# Patient Record
Sex: MALE | Race: ASIAN | NOT HISPANIC OR LATINO | Employment: FULL TIME | URBAN - METROPOLITAN AREA
[De-identification: names, ages, dates, MRNs, and addresses within clinical notes are randomized per-mention and may not be internally consistent; named-entity substitution may affect disease eponyms.]

---

## 2018-05-18 ENCOUNTER — OFFICE VISIT - HEALTHEAST (OUTPATIENT)
Dept: FAMILY MEDICINE | Facility: CLINIC | Age: 38
End: 2018-05-18

## 2018-05-18 DIAGNOSIS — G47.33 OBSTRUCTIVE SLEEP APNEA SYNDROME: ICD-10-CM

## 2018-05-18 DIAGNOSIS — F43.21 ADJUSTMENT REACTION OF ADOLESCENCE WITH DEPRESSED MOOD: ICD-10-CM

## 2018-05-18 DIAGNOSIS — R03.0 ELEVATED BLOOD PRESSURE READING WITHOUT DIAGNOSIS OF HYPERTENSION: ICD-10-CM

## 2018-05-18 LAB
ALBUMIN SERPL-MCNC: 3.6 G/DL (ref 3.5–5)
ALBUMIN UR-MCNC: ABNORMAL MG/DL
ALP SERPL-CCNC: 62 U/L (ref 45–120)
ALT SERPL W P-5'-P-CCNC: 43 U/L (ref 0–45)
ANION GAP SERPL CALCULATED.3IONS-SCNC: 11 MMOL/L (ref 5–18)
APPEARANCE UR: CLEAR
AST SERPL W P-5'-P-CCNC: 31 U/L (ref 0–40)
BACTERIA #/AREA URNS HPF: ABNORMAL HPF
BILIRUB SERPL-MCNC: 0.7 MG/DL (ref 0–1)
BILIRUB UR QL STRIP: NEGATIVE
BUN SERPL-MCNC: 17 MG/DL (ref 8–22)
CALCIUM SERPL-MCNC: 8.9 MG/DL (ref 8.5–10.5)
CHLORIDE BLD-SCNC: 107 MMOL/L (ref 98–107)
CO2 SERPL-SCNC: 22 MMOL/L (ref 22–31)
COLOR UR AUTO: YELLOW
CREAT SERPL-MCNC: 0.74 MG/DL (ref 0.7–1.3)
CREAT UR-MCNC: 155.2 MG/DL
GFR SERPL CREATININE-BSD FRML MDRD: >60 ML/MIN/1.73M2
GLUCOSE BLD-MCNC: 99 MG/DL (ref 70–125)
GLUCOSE UR STRIP-MCNC: NEGATIVE MG/DL
HBA1C MFR BLD: 5.9 % (ref 3.5–6)
HGB UR QL STRIP: NEGATIVE
KETONES UR STRIP-MCNC: NEGATIVE MG/DL
LEUKOCYTE ESTERASE UR QL STRIP: NEGATIVE
MICROALBUMIN UR-MCNC: 12.62 MG/DL (ref 0–1.99)
MICROALBUMIN/CREAT UR: 81.3 MG/G
MUCOUS THREADS #/AREA URNS LPF: ABNORMAL LPF
NITRATE UR QL: NEGATIVE
PH UR STRIP: 5.5 [PH] (ref 5–8)
POTASSIUM BLD-SCNC: 3.8 MMOL/L (ref 3.5–5)
PROLACTIN SERPL-MCNC: 5.2 NG/ML (ref 0–15)
PROT SERPL-MCNC: 7.5 G/DL (ref 6–8)
RBC #/AREA URNS AUTO: ABNORMAL HPF
SODIUM SERPL-SCNC: 140 MMOL/L (ref 136–145)
SP GR UR STRIP: 1.02 (ref 1–1.03)
SQUAMOUS #/AREA URNS AUTO: ABNORMAL LPF
TSH SERPL DL<=0.005 MIU/L-ACNC: 2.08 UIU/ML (ref 0.3–5)
UROBILINOGEN UR STRIP-ACNC: ABNORMAL
WBC #/AREA URNS AUTO: ABNORMAL HPF

## 2018-05-18 ASSESSMENT — MIFFLIN-ST. JEOR: SCORE: 2016.23

## 2018-05-22 ENCOUNTER — COMMUNICATION - HEALTHEAST (OUTPATIENT)
Dept: FAMILY MEDICINE | Facility: CLINIC | Age: 38
End: 2018-05-22

## 2018-05-22 LAB
TESTOST SERPL-MCNC: 256 NG/DL (ref 240–950)
TESTOSTERONE, BIOAVAILABLE, S: 110 NG/DL (ref 72–235)

## 2018-06-04 ENCOUNTER — COMMUNICATION - HEALTHEAST (OUTPATIENT)
Dept: FAMILY MEDICINE | Facility: CLINIC | Age: 38
End: 2018-06-04

## 2019-01-11 ENCOUNTER — RECORDS - HEALTHEAST (OUTPATIENT)
Dept: GENERAL RADIOLOGY | Facility: CLINIC | Age: 39
End: 2019-01-11

## 2019-01-11 ENCOUNTER — COMMUNICATION - HEALTHEAST (OUTPATIENT)
Dept: FAMILY MEDICINE | Facility: CLINIC | Age: 39
End: 2019-01-11

## 2019-01-11 ENCOUNTER — OFFICE VISIT - HEALTHEAST (OUTPATIENT)
Dept: FAMILY MEDICINE | Facility: CLINIC | Age: 39
End: 2019-01-11

## 2019-01-11 DIAGNOSIS — R05.9 COUGH: ICD-10-CM

## 2019-01-11 DIAGNOSIS — J01.90 ACUTE SINUSITIS, RECURRENCE NOT SPECIFIED, UNSPECIFIED LOCATION: ICD-10-CM

## 2019-01-15 ENCOUNTER — OFFICE VISIT - HEALTHEAST (OUTPATIENT)
Dept: FAMILY MEDICINE | Facility: CLINIC | Age: 39
End: 2019-01-15

## 2019-01-15 DIAGNOSIS — R05.9 COUGH: ICD-10-CM

## 2019-01-19 ENCOUNTER — COMMUNICATION - HEALTHEAST (OUTPATIENT)
Dept: FAMILY MEDICINE | Facility: CLINIC | Age: 39
End: 2019-01-19

## 2019-01-20 ENCOUNTER — OFFICE VISIT - HEALTHEAST (OUTPATIENT)
Dept: FAMILY MEDICINE | Facility: CLINIC | Age: 39
End: 2019-01-20

## 2019-01-20 DIAGNOSIS — I10 HYPERTENSION, UNSPECIFIED TYPE: ICD-10-CM

## 2019-01-20 DIAGNOSIS — R05.9 COUGH: ICD-10-CM

## 2019-01-20 DIAGNOSIS — B96.89 ACUTE BACTERIAL SINUSITIS: ICD-10-CM

## 2019-01-20 DIAGNOSIS — J18.9 PNEUMONIA OF RIGHT LOWER LOBE DUE TO INFECTIOUS ORGANISM: ICD-10-CM

## 2019-01-20 DIAGNOSIS — J40 BRONCHITIS: ICD-10-CM

## 2019-01-20 DIAGNOSIS — R06.02 SOB (SHORTNESS OF BREATH): ICD-10-CM

## 2019-01-20 DIAGNOSIS — J01.90 ACUTE BACTERIAL SINUSITIS: ICD-10-CM

## 2019-01-20 DIAGNOSIS — H65.02 ACUTE SEROUS OTITIS MEDIA OF LEFT EAR, RECURRENCE NOT SPECIFIED: ICD-10-CM

## 2019-02-05 ENCOUNTER — OFFICE VISIT - HEALTHEAST (OUTPATIENT)
Dept: INTERNAL MEDICINE | Facility: CLINIC | Age: 39
End: 2019-02-05

## 2019-02-05 DIAGNOSIS — Z87.891 HISTORY OF NICOTINE DEPENDENCE: ICD-10-CM

## 2019-02-05 DIAGNOSIS — K21.9 GASTROESOPHAGEAL REFLUX DISEASE WITHOUT ESOPHAGITIS: ICD-10-CM

## 2019-02-05 DIAGNOSIS — J40 BRONCHITIS: ICD-10-CM

## 2019-02-05 ASSESSMENT — MIFFLIN-ST. JEOR: SCORE: 1998.09

## 2019-02-27 ENCOUNTER — OFFICE VISIT - HEALTHEAST (OUTPATIENT)
Dept: FAMILY MEDICINE | Facility: CLINIC | Age: 39
End: 2019-02-27

## 2019-02-27 DIAGNOSIS — R06.09 DYSPNEA ON EXERTION: ICD-10-CM

## 2019-02-27 DIAGNOSIS — R07.89 CHEST TIGHTNESS: ICD-10-CM

## 2019-02-28 ENCOUNTER — COMMUNICATION - HEALTHEAST (OUTPATIENT)
Dept: FAMILY MEDICINE | Facility: CLINIC | Age: 39
End: 2019-02-28

## 2019-03-01 ENCOUNTER — COMMUNICATION - HEALTHEAST (OUTPATIENT)
Dept: CARE COORDINATION | Facility: CLINIC | Age: 39
End: 2019-03-01

## 2019-03-04 ENCOUNTER — AMBULATORY - HEALTHEAST (OUTPATIENT)
Dept: PULMONOLOGY | Facility: OTHER | Age: 39
End: 2019-03-04

## 2019-03-04 ENCOUNTER — OFFICE VISIT - HEALTHEAST (OUTPATIENT)
Dept: FAMILY MEDICINE | Facility: CLINIC | Age: 39
End: 2019-03-04

## 2019-03-04 DIAGNOSIS — G47.33 OBSTRUCTIVE SLEEP APNEA SYNDROME: ICD-10-CM

## 2019-03-04 DIAGNOSIS — R06.83 SNORING: ICD-10-CM

## 2019-03-04 DIAGNOSIS — J45.40 REACTIVE AIRWAY DISEASE WITH WHEEZING, MODERATE PERSISTENT, UNCOMPLICATED: ICD-10-CM

## 2019-03-04 DIAGNOSIS — G47.10 HYPERSOMNOLENCE: ICD-10-CM

## 2019-03-04 DIAGNOSIS — J45.909 REACTIVE AIRWAY DISEASE: ICD-10-CM

## 2019-03-25 ENCOUNTER — COMMUNICATION - HEALTHEAST (OUTPATIENT)
Dept: FAMILY MEDICINE | Facility: CLINIC | Age: 39
End: 2019-03-25

## 2019-03-25 ENCOUNTER — OFFICE VISIT - HEALTHEAST (OUTPATIENT)
Dept: SLEEP MEDICINE | Facility: CLINIC | Age: 39
End: 2019-03-25

## 2019-03-25 DIAGNOSIS — E66.813 CLASS 3 SEVERE OBESITY WITH SERIOUS COMORBIDITY IN ADULT, UNSPECIFIED BMI, UNSPECIFIED OBESITY TYPE (H): ICD-10-CM

## 2019-03-25 DIAGNOSIS — R29.818 SUSPECTED SLEEP APNEA: ICD-10-CM

## 2019-03-25 DIAGNOSIS — E66.01 CLASS 3 SEVERE OBESITY WITH SERIOUS COMORBIDITY IN ADULT, UNSPECIFIED BMI, UNSPECIFIED OBESITY TYPE (H): ICD-10-CM

## 2019-03-25 DIAGNOSIS — R53.82 CHRONIC FATIGUE: ICD-10-CM

## 2019-03-25 DIAGNOSIS — R06.83 SNORING: ICD-10-CM

## 2019-03-25 ASSESSMENT — MIFFLIN-ST. JEOR: SCORE: 2027.57

## 2019-03-26 ENCOUNTER — OFFICE VISIT - HEALTHEAST (OUTPATIENT)
Dept: FAMILY MEDICINE | Facility: CLINIC | Age: 39
End: 2019-03-26

## 2019-03-26 ENCOUNTER — RECORDS - HEALTHEAST (OUTPATIENT)
Dept: GENERAL RADIOLOGY | Facility: CLINIC | Age: 39
End: 2019-03-26

## 2019-03-26 DIAGNOSIS — J45.40 REACTIVE AIRWAY DISEASE WITH WHEEZING, MODERATE PERSISTENT, UNCOMPLICATED: ICD-10-CM

## 2019-03-26 DIAGNOSIS — R07.81 PLEURODYNIA: ICD-10-CM

## 2019-03-26 DIAGNOSIS — R07.81 RIB PAIN ON LEFT SIDE: ICD-10-CM

## 2019-03-26 DIAGNOSIS — S22.32XA CLOSED FRACTURE OF ONE RIB OF LEFT SIDE, INITIAL ENCOUNTER: ICD-10-CM

## 2019-04-10 ENCOUNTER — COMMUNICATION - HEALTHEAST (OUTPATIENT)
Dept: FAMILY MEDICINE | Facility: CLINIC | Age: 39
End: 2019-04-10

## 2019-04-19 ENCOUNTER — COMMUNICATION - HEALTHEAST (OUTPATIENT)
Dept: FAMILY MEDICINE | Facility: CLINIC | Age: 39
End: 2019-04-19

## 2019-04-23 ENCOUNTER — RECORDS - HEALTHEAST (OUTPATIENT)
Dept: GENERAL RADIOLOGY | Facility: CLINIC | Age: 39
End: 2019-04-23

## 2019-04-23 ENCOUNTER — OFFICE VISIT - HEALTHEAST (OUTPATIENT)
Dept: FAMILY MEDICINE | Facility: CLINIC | Age: 39
End: 2019-04-23

## 2019-04-23 DIAGNOSIS — R05.9 COUGH: ICD-10-CM

## 2019-04-23 DIAGNOSIS — J45.40 REACTIVE AIRWAY DISEASE WITH WHEEZING, MODERATE PERSISTENT, UNCOMPLICATED: ICD-10-CM

## 2019-04-23 DIAGNOSIS — R07.81 RIB PAIN ON LEFT SIDE: ICD-10-CM

## 2019-04-23 DIAGNOSIS — K14.3 COATED TONGUE: ICD-10-CM

## 2019-04-23 DIAGNOSIS — I10 BENIGN ESSENTIAL HTN: ICD-10-CM

## 2019-04-23 DIAGNOSIS — S22.42XD CLOSED FRACTURE OF MULTIPLE RIBS OF LEFT SIDE WITH ROUTINE HEALING, SUBSEQUENT ENCOUNTER: ICD-10-CM

## 2019-04-23 LAB
BASOPHILS # BLD AUTO: 0.1 THOU/UL (ref 0–0.2)
BASOPHILS NFR BLD AUTO: 1 % (ref 0–2)
EOSINOPHIL # BLD AUTO: 0.7 THOU/UL (ref 0–0.4)
EOSINOPHIL NFR BLD AUTO: 9 % (ref 0–6)
ERYTHROCYTE [DISTWIDTH] IN BLOOD BY AUTOMATED COUNT: 12.7 % (ref 11–14.5)
HCT VFR BLD AUTO: 48.7 % (ref 40–54)
HGB BLD-MCNC: 16.3 G/DL (ref 14–18)
LYMPHOCYTES # BLD AUTO: 1.7 THOU/UL (ref 0.8–4.4)
LYMPHOCYTES NFR BLD AUTO: 23 % (ref 20–40)
MCH RBC QN AUTO: 29.4 PG (ref 27–34)
MCHC RBC AUTO-ENTMCNC: 33.4 G/DL (ref 32–36)
MCV RBC AUTO: 88 FL (ref 80–100)
MONOCYTES # BLD AUTO: 0.5 THOU/UL (ref 0–0.9)
MONOCYTES NFR BLD AUTO: 7 % (ref 2–10)
NEUTROPHILS # BLD AUTO: 4.5 THOU/UL (ref 2–7.7)
NEUTROPHILS NFR BLD AUTO: 60 % (ref 50–70)
PLATELET # BLD AUTO: 195 THOU/UL (ref 140–440)
PMV BLD AUTO: 7 FL (ref 7–10)
RBC # BLD AUTO: 5.52 MILL/UL (ref 4.4–6.2)
WBC: 7.5 THOU/UL (ref 4–11)

## 2019-04-23 ASSESSMENT — MIFFLIN-ST. JEOR: SCORE: 1995.82

## 2019-04-24 ENCOUNTER — AMBULATORY - HEALTHEAST (OUTPATIENT)
Dept: FAMILY MEDICINE | Facility: CLINIC | Age: 39
End: 2019-04-24

## 2019-04-24 ENCOUNTER — COMMUNICATION - HEALTHEAST (OUTPATIENT)
Dept: FAMILY MEDICINE | Facility: CLINIC | Age: 39
End: 2019-04-24

## 2019-04-24 ENCOUNTER — AMBULATORY - HEALTHEAST (OUTPATIENT)
Dept: SCHEDULING | Facility: CLINIC | Age: 39
End: 2019-04-24

## 2019-04-24 DIAGNOSIS — S22.42XD CLOSED FRACTURE OF MULTIPLE RIBS OF LEFT SIDE WITH ROUTINE HEALING, SUBSEQUENT ENCOUNTER: ICD-10-CM

## 2019-04-24 LAB — BNP SERPL-MCNC: 23 PG/ML (ref 0–35)

## 2019-04-25 ENCOUNTER — AMBULATORY - HEALTHEAST (OUTPATIENT)
Dept: FAMILY MEDICINE | Facility: CLINIC | Age: 39
End: 2019-04-25

## 2019-04-25 DIAGNOSIS — J45.40 REACTIVE AIRWAY DISEASE WITH WHEEZING, MODERATE PERSISTENT, UNCOMPLICATED: ICD-10-CM

## 2019-05-03 ENCOUNTER — OFFICE VISIT - HEALTHEAST (OUTPATIENT)
Dept: FAMILY MEDICINE | Facility: CLINIC | Age: 39
End: 2019-05-03

## 2019-05-03 DIAGNOSIS — J45.40 REACTIVE AIRWAY DISEASE WITH WHEEZING, MODERATE PERSISTENT, UNCOMPLICATED: ICD-10-CM

## 2019-05-03 DIAGNOSIS — S22.42XD CLOSED FRACTURE OF MULTIPLE RIBS OF LEFT SIDE WITH ROUTINE HEALING, SUBSEQUENT ENCOUNTER: ICD-10-CM

## 2019-05-06 LAB
PATH ICD:: NORMAL
PROT PATTERN SERPL IFE-IMP: NORMAL
REVIEWING PATHOLOGIST: NORMAL

## 2019-05-07 LAB
PATH ICD:: NORMAL
PROT ELPH PNL UR ELPH: NORMAL
REVIEWING PATHOLOGIST: NORMAL

## 2019-05-09 ENCOUNTER — COMMUNICATION - HEALTHEAST (OUTPATIENT)
Dept: FAMILY MEDICINE | Facility: CLINIC | Age: 39
End: 2019-05-09

## 2019-05-15 ENCOUNTER — COMMUNICATION - HEALTHEAST (OUTPATIENT)
Dept: FAMILY MEDICINE | Facility: CLINIC | Age: 39
End: 2019-05-15

## 2019-05-24 ENCOUNTER — COMMUNICATION - HEALTHEAST (OUTPATIENT)
Dept: FAMILY MEDICINE | Facility: CLINIC | Age: 39
End: 2019-05-24

## 2019-05-24 DIAGNOSIS — I10 BENIGN ESSENTIAL HTN: ICD-10-CM

## 2019-06-24 ENCOUNTER — COMMUNICATION - HEALTHEAST (OUTPATIENT)
Dept: FAMILY MEDICINE | Facility: CLINIC | Age: 39
End: 2019-06-24

## 2019-07-02 ENCOUNTER — COMMUNICATION - HEALTHEAST (OUTPATIENT)
Dept: FAMILY MEDICINE | Facility: CLINIC | Age: 39
End: 2019-07-02

## 2019-07-02 DIAGNOSIS — J45.40 REACTIVE AIRWAY DISEASE WITH WHEEZING, MODERATE PERSISTENT, UNCOMPLICATED: ICD-10-CM

## 2019-07-25 ENCOUNTER — COMMUNICATION - HEALTHEAST (OUTPATIENT)
Dept: PULMONOLOGY | Facility: OTHER | Age: 39
End: 2019-07-25

## 2019-08-13 ENCOUNTER — RECORDS - HEALTHEAST (OUTPATIENT)
Dept: ADMINISTRATIVE | Facility: OTHER | Age: 39
End: 2019-08-13

## 2019-08-13 ENCOUNTER — TRANSFERRED RECORDS (OUTPATIENT)
Dept: HEALTH INFORMATION MANAGEMENT | Facility: CLINIC | Age: 39
End: 2019-08-13

## 2019-08-13 ENCOUNTER — RECORDS - HEALTHEAST (OUTPATIENT)
Dept: PULMONOLOGY | Facility: OTHER | Age: 39
End: 2019-08-13

## 2019-08-13 ENCOUNTER — OFFICE VISIT - HEALTHEAST (OUTPATIENT)
Dept: PULMONOLOGY | Facility: OTHER | Age: 39
End: 2019-08-13

## 2019-08-13 DIAGNOSIS — J45.51 SEVERE PERSISTENT ASTHMA WITH EXACERBATION (H): ICD-10-CM

## 2019-08-13 DIAGNOSIS — J45.909 UNSPECIFIED ASTHMA, UNCOMPLICATED: ICD-10-CM

## 2019-08-13 DIAGNOSIS — J45.40 REACTIVE AIRWAY DISEASE WITH WHEEZING, MODERATE PERSISTENT, UNCOMPLICATED: ICD-10-CM

## 2019-08-13 DIAGNOSIS — J45.50 SEVERE PERSISTENT ASTHMA WITHOUT COMPLICATION (H): ICD-10-CM

## 2019-08-13 ASSESSMENT — MIFFLIN-ST. JEOR: SCORE: 2013.96

## 2019-08-14 ENCOUNTER — COMMUNICATION - HEALTHEAST (OUTPATIENT)
Dept: PULMONOLOGY | Facility: OTHER | Age: 39
End: 2019-08-14

## 2019-10-07 ENCOUNTER — OFFICE VISIT - HEALTHEAST (OUTPATIENT)
Dept: PULMONOLOGY | Facility: OTHER | Age: 39
End: 2019-10-07

## 2019-10-07 ENCOUNTER — TRANSFERRED RECORDS (OUTPATIENT)
Dept: HEALTH INFORMATION MANAGEMENT | Facility: CLINIC | Age: 39
End: 2019-10-07

## 2019-10-07 DIAGNOSIS — J45.51 SEVERE PERSISTENT ASTHMA WITH EXACERBATION (H): ICD-10-CM

## 2019-12-06 ENCOUNTER — AMBULATORY - HEALTHEAST (OUTPATIENT)
Dept: FAMILY MEDICINE | Facility: CLINIC | Age: 39
End: 2019-12-06

## 2019-12-06 DIAGNOSIS — G47.33 OBSTRUCTIVE SLEEP APNEA SYNDROME: ICD-10-CM

## 2019-12-13 ENCOUNTER — TELEPHONE (OUTPATIENT)
Dept: SLEEP MEDICINE | Facility: CLINIC | Age: 39
End: 2019-12-13

## 2019-12-13 ENCOUNTER — PRE VISIT (OUTPATIENT)
Dept: SLEEP MEDICINE | Facility: CLINIC | Age: 39
End: 2019-12-13

## 2019-12-13 RX ORDER — TRIAMTERENE AND HYDROCHLOROTHIAZIDE 37.5; 25 MG/1; MG/1
CAPSULE ORAL EVERY MORNING
COMMUNITY
End: 2021-09-27

## 2019-12-13 RX ORDER — MONTELUKAST SODIUM 10 MG/1
10 TABLET ORAL AT BEDTIME
COMMUNITY
End: 2021-08-10

## 2019-12-13 RX ORDER — AMLODIPINE BESYLATE 5 MG/1
5 TABLET ORAL DAILY
COMMUNITY
End: 2022-01-18

## 2019-12-13 RX ORDER — ALBUTEROL SULFATE 90 UG/1
2 AEROSOL, METERED RESPIRATORY (INHALATION) EVERY 6 HOURS
COMMUNITY
End: 2022-07-12

## 2019-12-13 RX ORDER — IPRATROPIUM BROMIDE AND ALBUTEROL SULFATE 2.5; .5 MG/3ML; MG/3ML
1 SOLUTION RESPIRATORY (INHALATION) EVERY 6 HOURS PRN
COMMUNITY
End: 2022-07-12

## 2019-12-13 RX ORDER — BUDESONIDE AND FORMOTEROL FUMARATE DIHYDRATE 160; 4.5 UG/1; UG/1
2 AEROSOL RESPIRATORY (INHALATION) 2 TIMES DAILY
COMMUNITY
End: 2022-07-12

## 2019-12-13 NOTE — TELEPHONE ENCOUNTER
Records rev'd from [facility] regardin Asthma.  Records have been sent to scanning.    Second request for sleep study records has been made.    ALECIA Yañez

## 2019-12-13 NOTE — TELEPHONE ENCOUNTER
"  1.  Reason for the visit:  Consult to discuss sleep apnea  2.  Referring provider and clinic name:  Self  3.  Previous Sleep Doctor or Pulmonlogist (clinic name)?  Previous study Ordered by [facility] but has not been completed  4.  Records, Procedures, Imaging, and Labs (see below)  No records to obtain        All NOTES from previous office visits that pertain to why they are being seen in the Sleep Center    Previous Sleep Studies, Chest CT, Echos and reports that pertain to why they are seeing Sleep Center    All Sleep records that have been done in the last 2 years that pertain to why they are seeing Sleep Center            Are they being seen for continuation of care for Cpap/Bipap/Avap/Trilogy/Dental Device?     If yes to above Who and Where was Device issued/currently getting supplies from?     Are you currently on \"Supplemental Oxygen\" during the day or night?                                                                                                                                                         Please remind pt to bring Cpap machine and ask to arrive 15 minutes early to appointment due traffic and congestion                                                 5. Pt Sleep Center Packet received     Yes: \"please make sure that you bring this to your appointment completed, either the doctor will not see you until this completed or you may be asked to reschedule your appointment.\"     No: mail or email to the pt and explain, \"please make sure that you bring this to your appointment completed, either the doctor will not see you until this completed or you may be asked to reschedule your appointment.\"     ~If pt coming early to fill packet out, ask that they come 30 minutes prior to their appointment~     6. Has the pt's medication list been updated and preferred pharmacy added?     7. Has the allergy list been reviewed?    \"Thank you for choosing [facility] and we look forward to seeing " "you at your upcoming appointment\"     "

## 2020-01-06 ENCOUNTER — OFFICE VISIT - HEALTHEAST (OUTPATIENT)
Dept: PULMONOLOGY | Facility: OTHER | Age: 40
End: 2020-01-06

## 2020-01-06 DIAGNOSIS — J45.51 SEVERE PERSISTENT ASTHMA WITH EXACERBATION (H): ICD-10-CM

## 2020-01-06 ASSESSMENT — MIFFLIN-ST. JEOR: SCORE: 2072.93

## 2020-01-15 ENCOUNTER — COMMUNICATION - HEALTHEAST (OUTPATIENT)
Dept: FAMILY MEDICINE | Facility: CLINIC | Age: 40
End: 2020-01-15

## 2020-02-03 DIAGNOSIS — R29.818 SUSPECTED SLEEP APNEA: Primary | ICD-10-CM

## 2020-02-03 DIAGNOSIS — R53.82 CHRONIC FATIGUE: ICD-10-CM

## 2020-02-03 DIAGNOSIS — E66.01 MORBID OBESITY (H): ICD-10-CM

## 2020-02-03 DIAGNOSIS — R06.83 SNORING: ICD-10-CM

## 2020-02-07 ENCOUNTER — COMMUNICATION - HEALTHEAST (OUTPATIENT)
Dept: FAMILY MEDICINE | Facility: CLINIC | Age: 40
End: 2020-02-07

## 2020-02-21 ENCOUNTER — AMBULATORY - HEALTHEAST (OUTPATIENT)
Dept: FAMILY MEDICINE | Facility: CLINIC | Age: 40
End: 2020-02-21

## 2020-02-21 ENCOUNTER — COMMUNICATION - HEALTHEAST (OUTPATIENT)
Dept: FAMILY MEDICINE | Facility: CLINIC | Age: 40
End: 2020-02-21

## 2020-02-21 DIAGNOSIS — J45.50 SEVERE PERSISTENT ASTHMA WITHOUT COMPLICATION (H): ICD-10-CM

## 2020-03-04 ENCOUNTER — COMMUNICATION - HEALTHEAST (OUTPATIENT)
Dept: SLEEP MEDICINE | Facility: CLINIC | Age: 40
End: 2020-03-04

## 2020-03-16 ENCOUNTER — OFFICE VISIT - HEALTHEAST (OUTPATIENT)
Dept: PULMONOLOGY | Facility: OTHER | Age: 40
End: 2020-03-16

## 2020-03-16 ENCOUNTER — RECORDS - HEALTHEAST (OUTPATIENT)
Dept: ADMINISTRATIVE | Facility: OTHER | Age: 40
End: 2020-03-16

## 2020-03-16 DIAGNOSIS — J45.51 SEVERE PERSISTENT ASTHMA WITH EXACERBATION (H): ICD-10-CM

## 2020-03-16 DIAGNOSIS — J45.40 REACTIVE AIRWAY DISEASE WITH WHEEZING, MODERATE PERSISTENT, UNCOMPLICATED: ICD-10-CM

## 2020-03-19 ENCOUNTER — COMMUNICATION - HEALTHEAST (OUTPATIENT)
Dept: FAMILY MEDICINE | Facility: CLINIC | Age: 40
End: 2020-03-19

## 2020-03-19 DIAGNOSIS — I10 BENIGN ESSENTIAL HTN: ICD-10-CM

## 2020-04-30 ENCOUNTER — AMBULATORY - HEALTHEAST (OUTPATIENT)
Dept: PULMONOLOGY | Facility: OTHER | Age: 40
End: 2020-04-30

## 2020-04-30 DIAGNOSIS — J45.51 SEVERE PERSISTENT ASTHMA WITH EXACERBATION (H): ICD-10-CM

## 2020-06-04 ENCOUNTER — AMBULATORY - HEALTHEAST (OUTPATIENT)
Dept: PULMONOLOGY | Facility: OTHER | Age: 40
End: 2020-06-04

## 2020-06-04 DIAGNOSIS — J45.51 SEVERE PERSISTENT ASTHMA WITH EXACERBATION (H): ICD-10-CM

## 2020-06-04 DIAGNOSIS — J45.40 REACTIVE AIRWAY DISEASE WITH WHEEZING, MODERATE PERSISTENT, UNCOMPLICATED: ICD-10-CM

## 2020-07-10 ENCOUNTER — COMMUNICATION - HEALTHEAST (OUTPATIENT)
Dept: FAMILY MEDICINE | Facility: CLINIC | Age: 40
End: 2020-07-10

## 2020-07-10 ENCOUNTER — OFFICE VISIT - HEALTHEAST (OUTPATIENT)
Dept: FAMILY MEDICINE | Facility: CLINIC | Age: 40
End: 2020-07-10

## 2020-07-10 DIAGNOSIS — J45.50 SEVERE PERSISTENT ASTHMA WITHOUT COMPLICATION (H): ICD-10-CM

## 2020-07-10 DIAGNOSIS — J45.40 REACTIVE AIRWAY DISEASE WITH WHEEZING, MODERATE PERSISTENT, UNCOMPLICATED: ICD-10-CM

## 2020-07-10 ASSESSMENT — PATIENT HEALTH QUESTIONNAIRE - PHQ9: SUM OF ALL RESPONSES TO PHQ QUESTIONS 1-9: 3

## 2020-07-10 NOTE — ASSESSMENT & PLAN NOTE
Symbicort 2 puffs twice daily  Singulair 10 mg at bedtime    Rescue medication albuterol    Methylprednisolone if worsening symptoms    Tobacco is likely a trigger according to Dr. Davila    Symptoms include cough shortness of breath wheezing  Chest CT showed no focal pneumonias  Some rib fractures      Persistent asthma  Controller medicationSymbicort Singulair  Avoid tobacco  Medrol Dosepak for severe exacerbations  Albuterol for rescue medication  Paperwork filled out today [place]

## 2020-07-14 ENCOUNTER — AMBULATORY - HEALTHEAST (OUTPATIENT)
Dept: PULMONOLOGY | Facility: OTHER | Age: 40
End: 2020-07-14

## 2020-07-14 DIAGNOSIS — J45.51 SEVERE PERSISTENT ASTHMA WITH EXACERBATION (H): ICD-10-CM

## 2020-07-22 ENCOUNTER — COMMUNICATION - HEALTHEAST (OUTPATIENT)
Dept: FAMILY MEDICINE | Facility: CLINIC | Age: 40
End: 2020-07-22

## 2020-07-28 ENCOUNTER — AMBULATORY - HEALTHEAST (OUTPATIENT)
Dept: FAMILY MEDICINE | Facility: CLINIC | Age: 40
End: 2020-07-28

## 2020-07-28 DIAGNOSIS — Z20.9 EXPOSURE TO POTENTIAL INFECTION: ICD-10-CM

## 2020-07-29 ENCOUNTER — COMMUNICATION - HEALTHEAST (OUTPATIENT)
Dept: FAMILY MEDICINE | Facility: CLINIC | Age: 40
End: 2020-07-29

## 2020-07-29 DIAGNOSIS — I10 BENIGN ESSENTIAL HTN: ICD-10-CM

## 2020-07-29 DIAGNOSIS — J45.40 REACTIVE AIRWAY DISEASE WITH WHEEZING, MODERATE PERSISTENT, UNCOMPLICATED: ICD-10-CM

## 2020-09-21 ENCOUNTER — OFFICE VISIT - HEALTHEAST (OUTPATIENT)
Dept: PULMONOLOGY | Facility: OTHER | Age: 40
End: 2020-09-21

## 2020-09-21 DIAGNOSIS — J45.40 REACTIVE AIRWAY DISEASE WITH WHEEZING, MODERATE PERSISTENT, UNCOMPLICATED: ICD-10-CM

## 2020-09-21 ASSESSMENT — MIFFLIN-ST. JEOR: SCORE: 2072.93

## 2020-11-18 ENCOUNTER — COMMUNICATION - HEALTHEAST (OUTPATIENT)
Dept: PULMONOLOGY | Facility: OTHER | Age: 40
End: 2020-11-18

## 2020-11-18 DIAGNOSIS — J45.51 SEVERE PERSISTENT ASTHMA WITH EXACERBATION (H): ICD-10-CM

## 2020-11-20 ENCOUNTER — OFFICE VISIT - HEALTHEAST (OUTPATIENT)
Dept: FAMILY MEDICINE | Facility: CLINIC | Age: 40
End: 2020-11-20

## 2020-11-20 DIAGNOSIS — M25.562 ACUTE PAIN OF LEFT KNEE: ICD-10-CM

## 2020-11-20 DIAGNOSIS — Z23 NEED FOR IMMUNIZATION AGAINST INFLUENZA: ICD-10-CM

## 2020-11-20 DIAGNOSIS — I10 BENIGN ESSENTIAL HTN: ICD-10-CM

## 2020-11-20 ASSESSMENT — MIFFLIN-ST. JEOR: SCORE: 2089.49

## 2020-11-23 ENCOUNTER — COMMUNICATION - HEALTHEAST (OUTPATIENT)
Dept: FAMILY MEDICINE | Facility: CLINIC | Age: 40
End: 2020-11-23

## 2020-12-02 ENCOUNTER — COMMUNICATION - HEALTHEAST (OUTPATIENT)
Dept: FAMILY MEDICINE | Facility: CLINIC | Age: 40
End: 2020-12-02

## 2020-12-02 DIAGNOSIS — I10 BENIGN ESSENTIAL HTN: ICD-10-CM

## 2021-02-01 ENCOUNTER — OFFICE VISIT - HEALTHEAST (OUTPATIENT)
Dept: PULMONOLOGY | Facility: OTHER | Age: 41
End: 2021-02-01

## 2021-02-01 DIAGNOSIS — J45.50 SEVERE PERSISTENT ASTHMA WITHOUT COMPLICATION (H): ICD-10-CM

## 2021-02-01 ASSESSMENT — MIFFLIN-ST. JEOR: SCORE: 2095.61

## 2021-04-02 ENCOUNTER — AMBULATORY - HEALTHEAST (OUTPATIENT)
Dept: NURSING | Facility: CLINIC | Age: 41
End: 2021-04-02

## 2021-04-08 ENCOUNTER — COMMUNICATION - HEALTHEAST (OUTPATIENT)
Dept: PULMONOLOGY | Facility: OTHER | Age: 41
End: 2021-04-08

## 2021-04-08 DIAGNOSIS — J45.901 ASTHMA EXACERBATION: ICD-10-CM

## 2021-04-12 ENCOUNTER — AMBULATORY - HEALTHEAST (OUTPATIENT)
Dept: MULTI SPECIALTY CLINIC | Facility: CLINIC | Age: 41
End: 2021-04-12

## 2021-04-12 DIAGNOSIS — J45.901 ASTHMA EXACERBATION: ICD-10-CM

## 2021-04-23 ENCOUNTER — AMBULATORY - HEALTHEAST (OUTPATIENT)
Dept: NURSING | Facility: CLINIC | Age: 41
End: 2021-04-23

## 2021-05-17 ENCOUNTER — AMBULATORY - HEALTHEAST (OUTPATIENT)
Dept: MULTI SPECIALTY CLINIC | Facility: CLINIC | Age: 41
End: 2021-05-17

## 2021-05-17 DIAGNOSIS — J45.51 SEVERE PERSISTENT ASTHMA WITH EXACERBATION (H): ICD-10-CM

## 2021-05-26 ENCOUNTER — RECORDS - HEALTHEAST (OUTPATIENT)
Dept: ADMINISTRATIVE | Facility: CLINIC | Age: 41
End: 2021-05-26

## 2021-05-27 ENCOUNTER — RECORDS - HEALTHEAST (OUTPATIENT)
Dept: ADMINISTRATIVE | Facility: CLINIC | Age: 41
End: 2021-05-27

## 2021-05-27 ASSESSMENT — PATIENT HEALTH QUESTIONNAIRE - PHQ9: SUM OF ALL RESPONSES TO PHQ QUESTIONS 1-9: 3

## 2021-05-27 NOTE — TELEPHONE ENCOUNTER
Reason contacted:  Regarding Cough  Information relayed:  Spoke with pt's wife pt finished his steroid last 3/19/19.  Pt is having a pain on his left side new his rib cage.  Per pt's wife he is now having more issues with shortness of breath and pain when he coughs.  Pt is scheduled to see the pulmonary dr on 4/8/19 and dong his consult for a sleep study today. Per PCP pt is scheduled to come back in for a follow up tomorrow.  Additional questions: No  Further follow-up needed:  No  Okay to leave a detailed message:  No

## 2021-05-27 NOTE — PROGRESS NOTES
ASSESSMENT & PLAN    No problem-specific Assessment & Plan notes found for this encounter.      Placido was seen today for follow-up.    Diagnoses and all orders for this visit:    Rib pain on left side  -     XR Ribs Left W PA Chest; Future  -     oxyCODONE (ROXICODONE) 5 MG immediate release tablet; 1 TO 2 TABLETS EVERY 8 HOURS BREAKTHROUGH RIB PAIN    Reactive airway disease with wheezing, moderate persistent, uncomplicated  -     codeine-guaiFENesin (GUAIFENESIN AC)  mg/5 mL liquid; Take 5-10 mL by mouth every 6 (six) hours as needed for cough.  -     predniSONE (DELTASONE) 5 MG tablet; Take 15 mg by mouth daily for 5 days, THEN 10 mg daily for 5 days, THEN 5 mg daily for 5 days.  -     montelukast (SINGULAIR) 10 mg tablet; Take 1 tablet (10 mg total) by mouth at bedtime.        Patient Instructions       Follow through with the pulmonologist as your schedule    Let us restart a short course of prednisone  Prednisone 15 mg daily for 5 days then  Prednisone 10 mg daily for 5 days then  Prednisone 5 mg daily for 5 days then stop    Continue Symbicort 2 puffs twice daily  Add Singulair 10 mg at bedtime  Robitussin-AC can be used as a cough suppressant a 10 mL every 6 hours as needed  Have Bocanegra's or Ricola cough drops around to use these regularly    You likely torn muscle in your ribs or fractured a rib with coughing  We will try and sort this out with an x-ray today  You may take oxycodone 5 mg 1-2 tablets up to every 8 hours for pain control  This can be sedating  10 your ibuprofen 800 mg 3 times a day with food for up to 3 weeks        Return in about 3 months (around 6/26/2019).       Little interest or pleasure in doing things: Not at all  Feeling down, depressed, or hopeless: Not at all    CHIEF COMPLAINT: Placido Broderick had concerns including Follow-up (cough with left side pain x 3 days).    [place]: [place].............. had concerns including Follow-up (cough with left side pain x 3 days).    1. Rib pain on left  side    2. Reactive airway disease with wheezing, moderate persistent, uncomplicated          CC:             Why are you here today?                              Follow up cough with left side pain      3/4/2019 at that time he described that he had troubles with his breathing he had actually ended up in the hospital chest x-ray revealed central prominence interstitial  CT scan was densely normal    And recently quit using tobacco    BNP 14  D-dimer 0.38    No history of chronic asthma    No pets at home  Saw me received a prednisone taper  Felt like his breathing was 75% improved  On Sunday 323  Was coughing felt a pop left side immediate pain  Since then he has had ongoing pain in his left chest area more anterior below the nipple line  And has had worsening breathing  The point where he feels like he is having trouble lying flat as well as having trouble with some exertion    Continues to use Symbicort  Continues to use the DuoNeb try  Met with the sleep doctor    Meeting with pulmonary in 2 weeks  Will be traveling to [place]  Here today with his wife        What is the issue like in one word?:                                 Pain left anterior chest  How long is it ongoing: days, weeks,months?:                 Worse since 323  What makes it worse: activity? Eating? Movement? :      Deep breathing coughing  What makes it better?:                                                      Advil  0/10-10/10:Pain/Intesity                                                    moderate pain    Any associated Sx to above complaint:   No new productive cough    Any other Problems in order of Priority:        SUBJECTIVE:  Placido Broderick is a 39 y.o. male    Past Medical History:   Diagnosis Date     Ankylosis Of The Right Foot     Created by Conversion      Helicobacter Pylori (H. Pylori) Infection     Created by Conversion      Morbid obesity (H)     Created by Conversion      Past Surgical History:   Procedure Laterality Date      INGUINAL HERNIA REPAIR Right      Hydrocodone-acetaminophen  Current Outpatient Medications   Medication Sig Dispense Refill     albuterol (PROAIR HFA;PROVENTIL HFA;VENTOLIN HFA) 90 mcg/actuation inhaler Inhale 2 puffs every 4 (four) hours as needed for wheezing or shortness of breath.       budesonide-formoterol (SYMBICORT) 160-4.5 mcg/actuation inhaler Inhale 2 puffs 2 (two) times a day. 1 Inhaler 12     ipratropium-albuterol (DUO-NEB) 0.5-2.5 mg/3 mL nebulizer Take 3 mL by nebulization 4 (four) times a day. 360 mL 0     omeprazole (PRILOSEC) 20 MG capsule Take 1 capsule (20 mg total) by mouth at bedtime. 30 capsule 0     valsartan-hydrochlorothiazide (DIOVAN HCT) 160-25 mg per tablet Take 1 tablet by mouth daily. 90 tablet 3     codeine-guaiFENesin (GUAIFENESIN AC)  mg/5 mL liquid Take 5-10 mL by mouth every 6 (six) hours as needed for cough. 473 mL 0     montelukast (SINGULAIR) 10 mg tablet Take 1 tablet (10 mg total) by mouth at bedtime. 30 tablet 2     oxyCODONE (ROXICODONE) 5 MG immediate release tablet 1 TO 2 TABLETS EVERY 8 HOURS BREAKTHROUGH RIB PAIN 30 tablet 0     predniSONE (DELTASONE) 5 MG tablet Take 15 mg by mouth daily for 5 days, THEN 10 mg daily for 5 days, THEN 5 mg daily for 5 days. 30 tablet 0     No current facility-administered medications for this visit.      Family History   Problem Relation Age of Onset     Snoring Brother      Social History     Socioeconomic History     Marital status:      Spouse name: None     Number of children: None     Years of education: None     Highest education level: None   Occupational History     None   Social Needs     Financial resource strain: None     Food insecurity:     Worry: None     Inability: None     Transportation needs:     Medical: None     Non-medical: None   Tobacco Use     Smoking status: Former Smoker     Last attempt to quit: 2018     Years since quittin.2     Smokeless tobacco: Never Used   Substance and Sexual  Activity     Alcohol use: Yes     Alcohol/week: 8.4 oz     Types: 14 Cans of beer per week     Drug use: No     Sexual activity: Yes     Partners: Female   Lifestyle     Physical activity:     Days per week: None     Minutes per session: None     Stress: None   Relationships     Social connections:     Talks on phone: None     Gets together: None     Attends [employer] service: None     Active member of club or organization: None     Attends meetings of clubs or organizations: None     Relationship status: None     Intimate partner violence:     Fear of current or ex partner: None     Emotionally abused: None     Physically abused: None     Forced sexual activity: None   Other Topics Concern     None   Social History Narrative     None     Patient Active Problem List   Diagnosis     Class 3 obesity in adult     Nicotine Dependence     Prehypertension     Helicobacter Pylori (H. Pylori) Infection     Ankylosis Of The Right Foot     Plantar Fasciitis     Morbid Obesity     Pes Planus     Obstructive sleep apnea syndrome     Vitamin D deficiency     Adjustment disorder with mixed anxiety and depressed mood     Reactive airway disease with wheezing, moderate persistent, uncomplicated     Tachycardia     Benign essential HTN                                              SOCIAL: He  reports that he quit smoking about 3 months ago. he has never used smokeless tobacco. He reports that he drinks about 8.4 oz of alcohol per week. He reports that he does not use drugs.    REVIEW OF SYSTEMS:   Family history not pertinent to chief complaint or presenting problem    Review of Systems:      Nervous System:  No headache, paresthesia or dizziness or fainting                                  Ears: No hearing loss or ringing in the ears    Eyes: No blurring of vision, Double Vision            No redness, itching or dryness.    Nose: No nosebleed or loss of smell    Mouth: No mouth sores or  coated tongue    Throat: No hoarseness or  difficulty swallowing    Neck: No enlarged thyroid or lymph nodes.    Heart: No chest pain, palpitation or irregular heartbeat.                  Lungs: No shortness of breath, positive wheezing or hemoptysis.    Gastrointestinal: No nausea or vomiting, melena or blood in stools.    Kidney/Bladdr: No polyuria, polydipsia, or hematuria.                             Genital/Sexual: No Sex function Changes                                Skin: No rash    Muscles/Joints/Bones: No Muscle morning stiffness, No Effusion of a Joint     Review of systems otherwise negative as requested from patient, except   Those positive ROS outlined and discussed in [place].    OBJECTIVE:  /84 (Patient Site: Right Arm, Patient Position: Sitting, Cuff Size: Adult Regular)   Pulse (!) 104   Wt (!) 268 lb (121.6 kg)   SpO2 95%   BMI 46.00 kg/m      GENERAL:     No acute distress.   Alert and oriented X 3         Physical:    Sclera are slightly injected but non-chemotic  Nasal mucosa is boggy and congested  Oropharynx is clear  Lungs soft but tight wheezing  Cardiac S1-S2 regular sinus no appreciable murmur gallop  Anterior chest wall pain  No lower extremity edema  Calves are supple  Speaks in normal word strings        ASSESSMENT & PLAN    LIKELY ASTHMA EXACERBATION  ? TRIGGER  PLAN PT INSTRUCTION SECTION  SLEEP STUDY SOON  AIR PURIFIER      Cummins was seen today for follow-up.    Diagnoses and all orders for this visit:    Rib pain on left side  -     XR Ribs Left W PA Chest; Future  -     oxyCODONE (ROXICODONE) 5 MG immediate release tablet; 1 TO 2 TABLETS EVERY 8 HOURS BREAKTHROUGH RIB PAIN    Reactive airway disease with wheezing, moderate persistent, uncomplicated  -     codeine-guaiFENesin (GUAIFENESIN AC)  mg/5 mL liquid; Take 5-10 mL by mouth every 6 (six) hours as needed for cough.  -     predniSONE (DELTASONE) 5 MG tablet; Take 15 mg by mouth daily for 5 days, THEN 10 mg daily for 5 days, THEN 5 mg daily for 5 days.  -      montelukast (SINGULAIR) 10 mg tablet; Take 1 tablet (10 mg total) by mouth at bedtime.        Return in about 3 months (around 6/26/2019).       Anticipatory Guidance and Symptomatic Cares Discussed   Advised to call back directly if there are further questions, or if these symptoms fail to improve as anticipated or worsen.  Return to clinic if patient has a clinical concern that warrants an exam.         I spent 35  minutes with this patient face to face, of which 50% or greater was spent in counseling and coordination of care with regards to Cummins was seen today for follow-up.    Diagnoses and all orders for this visit:    Rib pain on left side  -     XR Ribs Left W PA Chest; Future  -     oxyCODONE (ROXICODONE) 5 MG immediate release tablet; 1 TO 2 TABLETS EVERY 8 HOURS BREAKTHROUGH RIB PAIN    Reactive airway disease with wheezing, moderate persistent, uncomplicated  -     codeine-guaiFENesin (GUAIFENESIN AC)  mg/5 mL liquid; Take 5-10 mL by mouth every 6 (six) hours as needed for cough.  -     predniSONE (DELTASONE) 5 MG tablet; Take 15 mg by mouth daily for 5 days, THEN 10 mg daily for 5 days, THEN 5 mg daily for 5 days.  -     montelukast (SINGULAIR) 10 mg tablet; Take 1 tablet (10 mg total) by mouth at bedtime.        Harry Bess MD  Family Medicine   [facility] [id number] [phone]

## 2021-05-27 NOTE — PROGRESS NOTES
Dear Dr. Bess, Harry LEROY Md  [address] [place] [address]    Thank you for the opportunity to participate in the care of Mr. Placido Broderick.    He is a 39 y.o. male who comes to the clinic with a chief complaint of witnessed apnea.  Patient has been told by his wife that he has significant pauses in his breathing during sleep followed by loud snoring for at least 3-4 years.  He also occasionally suffers from fatigue during the day if he does not get his nap.  The patient also occasionally suffers from difficulty initiating and maintaining sleep.  Patient was recently hospitalized with chest discomfort and strongly advised to get a sleep study to rule out obstructive sleep apnea.  The patient's review of system system is otherwise negative.     Ideal Sleep-Wake Cycle(devoid of societal pressure):    Patient would try to initiate sleep at around midnight with a sleep latency of 20-30 minutes. The patient would have 2-3 awakening. Final wake up time is around 7-8 AM.    Past Medical History  Past Medical History:   Diagnosis Date     Ankylosis Of The Right Foot     Created by Conversion      Helicobacter Pylori (H. Pylori) Infection     Created by Conversion      Morbid obesity (H)     Created by Conversion         Past Surgical History  Past Surgical History:   Procedure Laterality Date     INGUINAL HERNIA REPAIR Right         Meds  Current Outpatient Medications   Medication Sig Dispense Refill     albuterol (ACCUNEB) 1.25 mg/3 mL nebulizer solution Take 1.25 mg by nebulization every 4 (four) hours as needed for wheezing or shortness of breath.       albuterol (PROAIR HFA;PROVENTIL HFA;VENTOLIN HFA) 90 mcg/actuation inhaler Inhale 2 puffs every 4 (four) hours as needed for wheezing or shortness of breath.       budesonide-formoterol (SYMBICORT) 160-4.5 mcg/actuation inhaler Inhale 2 puffs 2 (two) times a day. 1 Inhaler 12     ipratropium-albuterol (DUO-NEB) 0.5-2.5 mg/3 mL nebulizer Take 3 mL by nebulization 4  (four) times a day. 360 mL 0     omeprazole (PRILOSEC) 20 MG capsule Take 1 capsule (20 mg total) by mouth at bedtime. 30 capsule 0     valsartan-hydrochlorothiazide (DIOVAN HCT) 160-25 mg per tablet Take 1 tablet by mouth daily. 90 tablet 3     No current facility-administered medications for this visit.         Allergies  Hydrocodone-acetaminophen     Social History  Social History     Socioeconomic History     Marital status:      Spouse name: Not on file     Number of children: Not on file     Years of education: Not on file     Highest education level: Not on file   Occupational History     Not on file   Social Needs     Financial resource strain: Not on file     Food insecurity:     Worry: Not on file     Inability: Not on file     Transportation needs:     Medical: Not on file     Non-medical: Not on file   Tobacco Use     Smoking status: Former Smoker     Last attempt to quit: 2018     Years since quittin.2     Smokeless tobacco: Never Used   Substance and Sexual Activity     Alcohol use: Yes     Alcohol/week: 8.4 oz     Types: 14 Cans of beer per week     Drug use: No     Sexual activity: Yes     Partners: Female   Lifestyle     Physical activity:     Days per week: Not on file     Minutes per session: Not on file     Stress: Not on file   Relationships     Social connections:     Talks on phone: Not on file     Gets together: Not on file     Attends Jainism service: Not on file     Active member of club or organization: Not on file     Attends meetings of clubs or organizations: Not on file     Relationship status: Not on file     Intimate partner violence:     Fear of current or ex partner: Not on file     Emotionally abused: Not on file     Physically abused: Not on file     Forced sexual activity: Not on file   Other Topics Concern     Not on file   Social History Narrative     Not on file        Family History  No family history on file.     Review of Systems:  Constitutional:  Negative except as noted in HPI.   Eyes: Negative except as noted in HPI.   ENT: Negative except as noted in HPI.   Cardiovascular: Negative except as noted in HPI.   Respiratory: Negative except as noted in HPI.   Gastrointestinal: Negative except as noted in HPI.   Genitourinary: Negative except as noted in HPI.   Musculoskeletal: Negative except as noted in HPI.   Integumentary: Negative except as noted in HPI.   Neurological: Negative except as noted in HPI.   Psychiatric: Negative except as noted in HPI.   Endocrine: Negative except as noted in HPI.   Hematologic/Lymphatic: Negative except as noted in HPI.      STOP BANG 3/25/2019   Do you snore loudly (louder than talking or loud enough to be heard through closed doors)? 1   Do you often feel tired, fatigued, or sleepy during daytime? 1   Has anyone observed you stop breathing in your sleep? 1   Do you have or are you being treated for high blood pressure? 1   BMI more than 35 kg/m2 1   Age over 50 years old? 0   Neck circumference greater than 16 inches? 1   Gender male? 1   Total Score 7     Epworths Sleepiness Scale 3/25/2019   Sitting and reading 2   Watching TV 1   Sitting, inactive in a public place (e.g. a theatre or a meeting) 0   As a passenger in a car for an hour without a break 0   Lying down to rest in the afternoon when circumstances permit 1   Sitting and talking to someone 0   Sitting quietly after a lunch without alcohol 0   In a car, while stopped for a few minutes in traffic 0   Total score 4     Rooming 3/25/2019   Usual bedtime 2am   Sleep Latency 20-30 min   Awakenings 2-3   Wake Up Time 617am   Weekends 12am to 7/8am   Difficulty falling asleep Yes   Difficulty staying asleep Yes   Excessive daytime tiredness No   Excessive daytime sleepiness No   Dozing off while driving No   Shift Worker No   Sleep Walking? No   Sleep Talking? No   Kicking or punching? No   Restless legs symptoms No       Physical Exam:  /88   Pulse (!) 115    "Ht 5' 4\" (1.626 m)   Wt (!) 266 lb (120.7 kg)   SpO2 94%   BMI 45.66 kg/m    BMI:Body mass index is 45.66 kg/m .   GEN: NAD, morbidly obese  Head: Normocephalic.  EYES: PERRLA, EOMI  ENT: Oropharynx is clear, mallampatti class 4+ airway.   Nasal mucosa is moist without erythema  Neck : Thyroid is within normal limits.  Neck circumference 18.25\"  CV: Regular rate and rhythm, S1 & S2 positive.  LUNGS: Bilateral breathsounds heard.   ABDOMEN: Positive bowel sounds in all quadrants, soft, no rebound or guarding  MUSCULOSKELETAL: Bilateral trace leg swelling  SKIN: warm, dry, no rashes  Neurological: Alert, oriented to time, place, and person.  Psych: normal mood, normal affect     Labs/Studies:     Lab Results   Component Value Date    WBC 6.2 02/27/2019    HGB 16.3 02/27/2019    HCT 49.5 02/27/2019    MCV 87 02/27/2019     02/27/2019         Chemistry        Component Value Date/Time     02/28/2019 0618    K 3.6 02/28/2019 0618     02/28/2019 0618    CO2 31 02/28/2019 0618    BUN 17 02/28/2019 0618    CREATININE 0.83 02/28/2019 0618     02/28/2019 0618        Component Value Date/Time    CALCIUM 9.1 02/28/2019 0618    ALKPHOS 62 05/18/2018 1437    AST 31 05/18/2018 1437    ALT 43 05/18/2018 1437    BILITOT 0.7 05/18/2018 1437            No results found for: FERRITIN  Lab Results   Component Value Date    TSH 2.08 05/18/2018         Assessment and Plan:  In summary Placido Broderick is a 39 y.o. year old male here for sleep disturbance.  1.  Suspected sleep apnea   Mr. Placido Broderick has high risk for obstructive sleep apnea based on the history of witnessed apnea, snoring and a crowded airway. I educated the patient on the underlying pathophysiology of obstructive sleep apnea. We reviewed the risks associated with sleep apnea, including increased cardiovascular risk and overall death. We talked about treatments briefly. I recommend getting an split-night nocturnal polysomnography. The patient should " return to the clinic to discuss results and treatment option in a patient-centered approach.  2.  Chronic fatigue  3.  Snoring  4.  Morbid obesity    Patient verbalized understanding of these issues, agrees with the plan and all questions were answered today. Patient was given an opportuntity to voice any other symptoms or concerns not listed above. Patient did not have any other symptoms or concerns.      Patient told to return in one week after the sleep study is interpreted.      Pelon Jackman DO  Board Certified in Internal Medicine and Sleep Medicine  [facility].    (Note created with Dragon voice recognition and unintended spelling errors and word substitutions may occur)

## 2021-05-27 NOTE — TELEPHONE ENCOUNTER
Describe your symptoms:  Still coughing after finishing the prednisone  Any pain: no  New/Ongoing: Ongoing  How long have you been having symptoms:  3  week(s)  Have you been seen for this:  Yes.  Have your symptoms changed since this visit? No  Triage offered?: Spouse not with patient  Home remedies tried:  Presnisone  Pharmacy Name and Location:  [facility] on [address]  Okay to leave a detailed message? Yes

## 2021-05-27 NOTE — PATIENT INSTRUCTIONS - HE
Follow through with the pulmonologist as your schedule    Let us restart a short course of prednisone  Prednisone 15 mg daily for 5 days then  Prednisone 10 mg daily for 5 days then  Prednisone 5 mg daily for 5 days then stop    Continue Symbicort 2 puffs twice daily  Add Singulair 10 mg at bedtime  Robitussin-AC can be used as a cough suppressant a 10 mL every 6 hours as needed  Have Bocanegra's or Ricola cough drops around to use these regularly    You likely torn muscle in your ribs or fractured a rib with coughing  We will try and sort this out with an x-ray today  You may take oxycodone 5 mg 1-2 tablets up to every 8 hours for pain control  This can be sedating  10 your ibuprofen 800 mg 3 times a day with food for up to 3 weeks    Melatonin  5 mg at target bedtime     Rib fracture approximately T8  Lifting restrictions through April 12  Expect 6 weeks for healing

## 2021-05-28 ASSESSMENT — ASTHMA QUESTIONNAIRES
ACT_TOTALSCORE: 18
ACT_TOTALSCORE: 13
ACT_TOTALSCORE: 18
ACT_TOTALSCORE: 14

## 2021-05-28 NOTE — PROGRESS NOTES
"ASSESSMENT & PLAN    No problem-specific Assessment & Plan notes found for this encounter.      Placido was seen today for wheezing and rib injury.    Diagnoses and all orders for this visit:    Cough  -     XR Chest 2 Views; Future  -     HM1(CBC and Differential)  -     BNP(B-type Natriuretic Peptide)  -     HM1 (CBC with Diff)  -     XR Chest 1 View; Future    Reactive airway disease with wheezing, moderate persistent, uncomplicated  -     ipratropium-albuterol (DUO-NEB) 0.5-2.5 mg/3 mL nebulizer; Take 3 mL by nebulization 4 (four) times a day.    Closed fracture of multiple ribs of left side with routine healing, subsequent encounter        There are no Patient Instructions on file for this visit.    No follow-ups on file.            CHIEF COMPLAINT: Placido Broderick had concerns including Wheezing and Rib Injury.    [place]: 1.............. had concerns including Wheezing and Rib Injury.    1. Cough    2. Reactive airway disease with wheezing, moderate persistent, uncomplicated    3. Closed fracture of multiple ribs of left side with routine healing, subsequent encounter          CC:             Why are you here today?                         Feeling better until last Saturday    Finished Prednisone  Using inhalers  Symbicort and Duoneb  Worse cough after being outside   Getting the kids ready to go on the bus  \"felt a pop\"  Left side worse    Now feels \"shitty\"  Pain and short breath  Was 70% better  NO feels 20% better.    Wheeze \"not sure triggers\"  \"Cold Air  Last winter [place]\"        Why are you here today?                              Cough     What is the issue like in one word?:                                 Chronic  How long is it ongoing: days, weeks,months?:                 X 1 1/2 months  What makes it worse: activity? Eating? Movement? :      n/a  What makes it better?:                                                      medications  0/10-10/10:Pain/Intesity                                                    " n/a     Any associated Sx to above complaint:   Was admitted to [facility] for breathing issues     Any other Problems in order of Priority:     Colonoscopy due 2026  use your Dulera daily as a prophylactic medication it is her controller medication     Use the albuterol as a rescue medication     Patient had multiple visits  outpatient as well as urgent care for cough with persistence that would improve after being treated with steroids antibiotics and then worsened again     He can identify no triggers     We discussed pets, allergens, even possibly his Diovan medication     Chest x-ray revealed central interstitial prominence with bronchial wall thickening CT scan was essentially within normal limits  Xray 3/2019  Broken rib      He recently quit smoking tobacco was congratulated in the first of the year     Medications were reviewed     Allergies hydrocodone  Currently he is using both albuterol Symbicort Singulair as well as DuoNeb    Sleep Study Monday  Pulmonary 13 th               Any other Problems in order of Priority:        SUBJECTIVE:  Placido Broderick is a 39 y.o. male    Past Medical History:   Diagnosis Date     Ankylosis Of The Right Foot     Created by Conversion      Helicobacter Pylori (H. Pylori) Infection     Created by Conversion      Morbid obesity (H)     Created by Conversion      Past Surgical History:   Procedure Laterality Date     INGUINAL HERNIA REPAIR Right      Hydrocodone-acetaminophen  Current Outpatient Medications   Medication Sig Dispense Refill     albuterol (PROAIR HFA;PROVENTIL HFA;VENTOLIN HFA) 90 mcg/actuation inhaler Inhale 2 puffs every 4 (four) hours as needed for wheezing or shortness of breath.       budesonide-formoterol (SYMBICORT) 160-4.5 mcg/actuation inhaler Inhale 2 puffs 2 (two) times a day. 1 Inhaler 12     montelukast (SINGULAIR) 10 mg tablet Take 1 tablet (10 mg total) by mouth at bedtime. 30 tablet 2     valsartan-hydrochlorothiazide (DIOVAN HCT) 160-25 mg  per tablet Take 1 tablet by mouth daily. 90 tablet 3     ipratropium-albuterol (DUO-NEB) 0.5-2.5 mg/3 mL nebulizer Take 3 mL by nebulization 4 (four) times a day. 360 mL 0     oxyCODONE (ROXICODONE) 5 MG immediate release tablet 1 TO 2 TABLETS EVERY 8 HOURS BREAKTHROUGH RIB PAIN 30 tablet 0     No current facility-administered medications for this visit.      Family History   Problem Relation Age of Onset     Snoring Brother      Social History     Socioeconomic History     Marital status:      Spouse name: None     Number of children: None     Years of education: None     Highest education level: None   Occupational History     None   Social Needs     Financial resource strain: None     Food insecurity:     Worry: None     Inability: None     Transportation needs:     Medical: None     Non-medical: None   Tobacco Use     Smoking status: Former Smoker     Last attempt to quit: 2018     Years since quittin.3     Smokeless tobacco: Never Used   Substance and Sexual Activity     Alcohol use: Yes     Alcohol/week: 8.4 oz     Types: 14 Cans of beer per week     Drug use: No     Sexual activity: Yes     Partners: Female   Lifestyle     Physical activity:     Days per week: None     Minutes per session: None     Stress: None   Relationships     Social connections:     Talks on phone: None     Gets together: None     Attends Anglican service: None     Active member of club or organization: None     Attends meetings of clubs or organizations: None     Relationship status: None     Intimate partner violence:     Fear of current or ex partner: None     Emotionally abused: None     Physically abused: None     Forced sexual activity: None   Other Topics Concern     None   Social History Narrative     None     Patient Active Problem List   Diagnosis     Class 3 obesity in adult     Nicotine Dependence     Prehypertension     Helicobacter Pylori (H. Pylori) Infection     Ankylosis Of The Right Foot     Plantar  "Fasciitis     Morbid Obesity     Pes Planus     Obstructive sleep apnea syndrome     Vitamin D deficiency     Adjustment disorder with mixed anxiety and depressed mood     Reactive airway disease with wheezing, moderate persistent, uncomplicated     Tachycardia     Benign essential HTN                                              SOCIAL: He  reports that he quit smoking about 4 months ago. He has never used smokeless tobacco. He reports that he drinks about 8.4 oz of alcohol per week. He reports that he does not use drugs.    REVIEW OF SYSTEMS:   Family history not pertinent to chief complaint or presenting problem    Review of Systems:      Nervous System:  No headache, paresthesia or dizziness or fainting                                  Ears: No hearing loss or ringing in the ears    Eyes: No blurring of vision, Double Vision            No redness, itching or dryness.    Nose: No nosebleed or loss of smell    Mouth: No mouth sores or  ++ coated tongue    Throat: No hoarseness or difficulty swallowing   Shallow Posterior Pharynx    Neck: No enlarged thyroid or lymph nodes.    Heart: No chest pain, palpitation or irregular heartbeat.                   Lungs:++++ shortness of breath, wheezing or No hemoptysis.    Gastrointestinal: No nausea or vomiting, melena or blood in stools.    Kidney/Bladdr: No polyuria, polydipsia, or hematuria.                             Genital/Sexual: No Sex function Changes                                Skin: No rash    Muscles/Joints/Bones: No Muscle morning stiffness, No Effusion of a Joint     Review of systems otherwise negative as requested from patient, except   Those positive ROS outlined and discussed in [place].    OBJECTIVE:  BP (!) 124/92 (Patient Site: Left Arm, Patient Position: Sitting, Cuff Size: Adult Large)   Pulse 100   Ht 5' 4\" (1.626 m)   Wt (!) 259 lb (117.5 kg)   SpO2 95%   BMI 44.46 kg/m      GENERAL:     No acute distress.   Alert and oriented X 3   "       Physical:    Sclera clear he has slight conjunctival ecchymosis  Nasal mucosa is congested  Oropharynx white coating on the tongue  Posterior pharynx shallow  No cervical or supraclavicular nodes palpable  Coarse wheezing bilaterally  Cardiac regular rate and rhythm no appreciable murmur  No use or retractions  He does have audible wheezing  Tenderness to palpation left lateral rib      Left Rib Film REviewed new fracture 6th visualized    INDICATION: Cough. Left-sided chest pain.  COMPARISON: 3/26/2019 PA chest and left rib views and CT chest 2/27/2019.     FINDINGS: Acute mildly displaced fractures involving the posterolateral aspect left ribs 5-8. In retrospect, the fracture at the posterolateral aspect of the seventh rib was present 3/26/2019 but the others are new. Review of the 2/27/2019 CT shows the   patient also has healing fractures of the posterolateral aspect left tenth and eleventh ribs and healed fracture deformities of the anterolateral aspect right ribs 4-7. Recommend correlation for appropriate mechanism of injury and/or evaluation for   metabolic bone disease or process such as myeloma. Of note no discrete bone lesions identified radiographically or at prior CT. Subpleural thickening throughout the lateral left chest wall at today's radiograph related to the acute fractures. No   pneumothorax. Lungs are clear. No pleural effusion. Heart size within normal limits.    ASSESSMENT & PLAN      Cummins was seen today for wheezing and rib injury.    Diagnoses and all orders for this visit:    Cough  -     XR Chest 2 Views; Future  -     HM1(CBC and Differential)  -     BNP(B-type Natriuretic Peptide)  -     HM1 (CBC with Diff)  -     XR Chest 1 View; Future    Reactive airway disease with wheezing, moderate persistent, uncomplicated  -     ipratropium-albuterol (DUO-NEB) 0.5-2.5 mg/3 mL nebulizer; Take 3 mL by nebulization 4 (four) times a day.    Closed fracture of multiple ribs of left side with  routine healing, subsequent encounter        No follow-ups on file.       Anticipatory Guidance and Symptomatic Cares Discussed   Advised to call back directly if there are further questions, or if these symptoms fail to improve as anticipated or worsen.  Return to clinic if patient has a clinical concern that warrants an exam.         I spent  minutes with this patient face to face, of which 50% or greater was spent in counseling and coordination of care with regards to Cummins was seen today for wheezing and rib injury.    Diagnoses and all orders for this visit:    Cough  -     XR Chest 2 Views; Future  -     HM1(CBC and Differential)  -     BNP(B-type Natriuretic Peptide)  -     HM1 (CBC with Diff)  -     XR Chest 1 View; Future    Reactive airway disease with wheezing, moderate persistent, uncomplicated  -     ipratropium-albuterol (DUO-NEB) 0.5-2.5 mg/3 mL nebulizer; Take 3 mL by nebulization 4 (four) times a day.    Closed fracture of multiple ribs of left side with routine healing, subsequent encounter        Harry Bess MD  [facility] [address] [phone]

## 2021-05-28 NOTE — PATIENT INSTRUCTIONS - HE
Oxycodone for pain control  Acetaminophen  500 mg 2 tabs every 8 hours     Fluconazole 150 mg Daily  For 7 days    OTC  Robitussin  Ricola cough drops or favorite    Stop Valsartan /25     Start Amlodipine 5 mg  1 daily AM  Start Dyazide  37.5/25   1 daily AM    Symbicort  2 puff Twice Daily    Rinse mouth after  Duoneb 1 neb every 4 hours       Dexa to look at bones and consider Bone abnormality    Refer to Osteoporosis

## 2021-05-28 NOTE — PROGRESS NOTES
"ASSESSMENT & PLAN    No problem-specific Assessment & Plan notes found for this encounter.      Placido was seen today for follow-up.    Diagnoses and all orders for this visit:    Closed fracture of multiple ribs of left side with routine healing, subsequent encounter  -     Immunofixation Electrophoresis, Serum; Future  -     Immunofixation Electrophoresis, Urine; Future  -     Immunofixation Electrophoresis, Serum  -     Immunofixation Electrophoresis, Urine    Reactive airway disease with wheezing, moderate persistent, uncomplicated  -     methylPREDNISolone (MEDROL DOSEPACK) 4 mg tablet; follow package directions        There are no Patient Instructions on file for this visit.    No follow-ups on file.            CHIEF COMPLAINT: Placido Broderick had concerns including Follow-up (ribs and cough).    [place]: 1.............. had concerns including Follow-up (ribs and cough).    1. Closed fracture of multiple ribs of left side with routine healing, subsequent encounter    2. Reactive airway disease with wheezing, moderate persistent, uncomplicated          CC:             Why are you here today?                              Follow up Rib fracture and cough    Cough 70%  Overall 70%     Hurt to breathe \"depends and left\"  Today w/ cough pain \"it hurts'  Have to grab 5/10     Sputum?  No   Hemoptysis ?  No  NO fever    Chills at work   Occasional night sweats     Left = Right Cramps muscle      Tylenol ES  Blood PRessure    Last No Inhaler    Claritin Daily  No Montelukast      Any other Problems in order of Priority:        SUBJECTIVE:  Placido Broderick is a 39 y.o. male    Past Medical History:   Diagnosis Date     Ankylosis Of The Right Foot     Created by Conversion      Helicobacter Pylori (H. Pylori) Infection     Created by Conversion      Morbid obesity (H)     Created by Conversion      Past Surgical History:   Procedure Laterality Date     INGUINAL HERNIA REPAIR Right      Hydrocodone-acetaminophen  Current Outpatient " Medications   Medication Sig Dispense Refill     albuterol (PROAIR HFA;PROVENTIL HFA;VENTOLIN HFA) 90 mcg/actuation inhaler Inhale 2 puffs every 4 (four) hours as needed for wheezing or shortness of breath.       amLODIPine (NORVASC) 5 MG tablet Take 1 tablet (5 mg total) by mouth daily. 30 tablet 11     budesonide-formoterol (SYMBICORT) 160-4.5 mcg/actuation inhaler Inhale 2 puffs 2 (two) times a day. 1 Inhaler 12     ipratropium-albuterol (DUO-NEB) 0.5-2.5 mg/3 mL nebulizer Take 3 mL by nebulization 4 (four) times a day. 360 mL 0     methylPREDNISolone (MEDROL DOSEPACK) 4 mg tablet follow package directions 21 tablet 0     oxyCODONE (ROXICODONE) 5 MG immediate release tablet 1 TO 2 TABLETS EVERY 8 HOURS BREAKTHROUGH RIB PAIN 40 tablet 0     triamterene-hydrochlorothiazide (DYAZIDE) 37.5-25 mg per capsule Take 1 capsule by mouth daily. 30 capsule 11     valsartan-hydrochlorothiazide (DIOVAN HCT) 160-25 mg per tablet Take 1 tablet by mouth daily. 90 tablet 3     No current facility-administered medications for this visit.      Family History   Problem Relation Age of Onset     Snoring Brother      Social History     Socioeconomic History     Marital status:      Spouse name: None     Number of children: None     Years of education: None     Highest education level: None   Occupational History     None   Social Needs     Financial resource strain: None     Food insecurity:     Worry: None     Inability: None     Transportation needs:     Medical: None     Non-medical: None   Tobacco Use     Smoking status: Former Smoker     Last attempt to quit: 2018     Years since quittin.3     Smokeless tobacco: Never Used   Substance and Sexual Activity     Alcohol use: Yes     Alcohol/week: 8.4 oz     Types: 14 Cans of beer per week     Drug use: No     Sexual activity: Yes     Partners: Female   Lifestyle     Physical activity:     Days per week: None     Minutes per session: None     Stress: None    Relationships     Social connections:     Talks on phone: None     Gets together: None     Attends Christianity service: None     Active member of club or organization: None     Attends meetings of clubs or organizations: None     Relationship status: None     Intimate partner violence:     Fear of current or ex partner: None     Emotionally abused: None     Physically abused: None     Forced sexual activity: None   Other Topics Concern     None   Social History Narrative     None     Patient Active Problem List   Diagnosis     Class 3 obesity in adult     Nicotine Dependence     Prehypertension     Helicobacter Pylori (H. Pylori) Infection     Ankylosis Of The Right Foot     Plantar Fasciitis     Morbid Obesity     Pes Planus     Obstructive sleep apnea syndrome     Vitamin D deficiency     Adjustment disorder with mixed anxiety and depressed mood     Reactive airway disease with wheezing, moderate persistent, uncomplicated     Tachycardia     Benign essential HTN                                              SOCIAL: He  reports that he quit smoking about 4 months ago. He has never used smokeless tobacco. He reports that he drinks about 8.4 oz of alcohol per week. He reports that he does not use drugs.    REVIEW OF SYSTEMS:   Family history not pertinent to chief complaint or presenting problem    Review of Systems:      Nervous System:  No headache, paresthesia or dizziness or fainting                                  Ears: No hearing loss or ringing in the ears    Eyes: No blurring of vision, Double Vision            No redness, itching or dryness.    Nose: No nosebleed or loss of smell    Mouth: No mouth sores or  coated tongue    Throat: No hoarseness or difficulty swallowing    Neck: No enlarged thyroid or lymph nodes.    Heart: No chest pain, palpitation or irregular heartbeat.                  Lungs: No shortness of breath, wheezing or hemoptysis.  But pleuritic chest pain when he takes a deep  breath    Gastrointestinal: No nausea or vomiting, melena or blood in stools.    Kidney/Bladdr: No polyuria, polydipsia, or hematuria.                             Genital/Sexual: No Sex function Changes                                Skin: No rash    Muscles/Joints/Bones: No Muscle morning stiffness, No Effusion of a Joint     Review of systems otherwise negative as requested from patient, except   Those positive ROS outlined and discussed in [place].    OBJECTIVE:  /80 (Patient Site: Right Arm, Patient Position: Sitting, Cuff Size: Adult Regular)   Wt (!) 248 lb (112.5 kg)   BMI 42.57 kg/m      GENERAL:     No acute distress.   Alert and oriented X 3         Physical:    Lungs today clear without wheezing  Forward strings  Cardiac regular rate and rhythm  Blood pressure recheck 122/88  Wart right cheek as well as right chin        ASSESSMENT & PLAN      Placido was seen today for follow-up.    Diagnoses and all orders for this visit:    Closed fracture of multiple ribs of left side with routine healing, subsequent encounter  -     Immunofixation Electrophoresis, Serum; Future  -     Immunofixation Electrophoresis, Urine; Future  -     Immunofixation Electrophoresis, Serum  -     Immunofixation Electrophoresis, Urine    Reactive airway disease with wheezing, moderate persistent, uncomplicated  -     methylPREDNISolone (MEDROL DOSEPACK) 4 mg tablet; follow package directions        No follow-ups on file.       Anticipatory Guidance and Symptomatic Cares Discussed   Advised to call back directly if there are further questions, or if these symptoms fail to improve as anticipated or worsen.  Return to clinic if patient has a clinical concern that warrants an exam.         I spent 20 minutes with this patient face to face, of which 50% or greater was spent in counseling and coordination of care with regards to Placido was seen today for follow-up.    Diagnoses and all orders for this visit:    Closed fracture of  multiple ribs of left side with routine healing, subsequent encounter  -     Immunofixation Electrophoresis, Serum; Future  -     Immunofixation Electrophoresis, Urine; Future  -     Immunofixation Electrophoresis, Serum  -     Immunofixation Electrophoresis, Urine    Reactive airway disease with wheezing, moderate persistent, uncomplicated  -     methylPREDNISolone (MEDROL DOSEPACK) 4 mg tablet; follow package directions        Harry Bess MD  [facility] [address] [phone]

## 2021-05-28 NOTE — TELEPHONE ENCOUNTER
Pt dropped off paperwork today. He is aware that it will take 3-5 business days for Shahriar to sign and complete it.     When the forms are done, pt can pick it up. Please call him at [phone].

## 2021-05-29 NOTE — TELEPHONE ENCOUNTER
Refill Approved    Rx renewed per Medication Renewal Policy. Medication was last renewed on 4/23/19.    Renu Li, [facility] Triage/Med Refill 5/25/2019     Requested Prescriptions   Pending Prescriptions Disp Refills     amLODIPine (NORVASC) 5 MG tablet [Pharmacy Med Name: AMLODIPINE BESYLATE 5MG TABLETS] 90 tablet 11     Sig: TAKE 1 TABLET(5 MG) BY MOUTH DAILY       Calcium-Channel Blockers Protocol Passed - 5/24/2019  2:23 PM        Passed - PCP or prescribing provider visit in past 12 months or next 3 months     Last office visit with prescriber/PCP: 5/3/2019 Harry Bess MD OR same dept: 5/3/2019 Harry Bess MD OR same specialty: 5/3/2019 Harry Bess MD  Last physical: Visit date not found Last MTM visit: Visit date not found   Next visit within 3 mo: Visit date not found  Next physical within 3 mo: Visit date not found  Prescriber OR PCP: Harry Bess MD  Last diagnosis associated with med order: 1. Benign essential HTN  - amLODIPine (NORVASC) 5 MG tablet [Pharmacy Med Name: AMLODIPINE BESYLATE 5MG TABLETS]; TAKE 1 TABLET(5 MG) BY MOUTH DAILY  Dispense: 90 tablet; Refill: 11    If protocol passes may refill for 12 months if within 3 months of last provider visit (or a total of 15 months).             Passed - Blood pressure filed in past 12 months     BP Readings from Last 1 Encounters:   05/03/19 120/80

## 2021-05-30 ENCOUNTER — HEALTH MAINTENANCE LETTER (OUTPATIENT)
Age: 41
End: 2021-05-30

## 2021-05-30 NOTE — TELEPHONE ENCOUNTER
FYI - Status Update  Who is Calling: Spouse  Update: Questioning if refill can be expedited. Patient will be out of medication prior to the 3 business day turn around time.  Okay to leave a detailed message?:  No return call needed

## 2021-05-30 NOTE — TELEPHONE ENCOUNTER
RN cannot approve Refill Request    RN can NOT refill this medication med is not covered by policy/route to provider. Last office visit: 5/3/2019 Harry Bess MD Last Physical: Visit date not found Last MTM visit: Visit date not found Last visit same specialty: 5/3/2019 Harry Bess MD.  Next visit within 3 mo: Visit date not found  Next physical within 3 mo: Visit date not found      Maria Elena Hammonds, [facility] Triage/Med Refill 7/2/2019    Requested Prescriptions   Pending Prescriptions Disp Refills     ipratropium-albuterol (DUO-NEB) 0.5-2.5 mg/3 mL nebulizer [Pharmacy Med Name: IPRATROPI/ALB 0.5/3MG INH SL 60X3ML] 3240 mL 5     Sig: USE 3 ML VIA NEBULIZER FOUR TIMES DAILY       There is no refill protocol information for this order

## 2021-05-30 NOTE — TELEPHONE ENCOUNTER
Refill Approved    Rx renewed per Medication Renewal Policy. Medication was last renewed on 4/23/19.    Agatha Rodgers, [facility] Triage/Med Refill 7/2/2019     Requested Prescriptions   Pending Prescriptions Disp Refills     ipratropium-albuterol (DUO-NEB) 0.5-2.5 mg/3 mL nebulizer 360 mL 0     Sig: Take 3 mL by nebulization 4 (four) times a day.       Asthma Medications Refill Protocol Passed - 7/2/2019 10:55 AM        Passed - PCP or prescribing provider visit in last year     Last office visit with prescriber/PCP: 5/3/2019 Harry Bess MD OR same dept: 5/3/2019 Harry Bess MD OR same specialty: 5/3/2019 Harry Bess MD  Last physical: Visit date not found Last MTM visit: Visit date not found    Next appt within 3 mo: Visit date not found Next physical within 3 mo: Visit date not found  Prescriber OR PCP: Harry Bess MD  Last diagnosis associated with med order: 1. Reactive airway disease with wheezing, moderate persistent, uncomplicated  - ipratropium-albuterol (DUO-NEB) 0.5-2.5 mg/3 mL nebulizer; Take 3 mL by nebulization 4 (four) times a day.  Dispense: 360 mL; Refill: 0    If protocol passes may refill for 6 months if within 3 months of last provider visit (or a total of 9 months).

## 2021-05-31 NOTE — PROGRESS NOTES
Assessment and Plan:Placido Broderick is a 39 y.o. M with a past medical history significant for obesity and asthma who presents to clinic today for evaluation of his cough and dyspnea.  He has severe asthma based on his PFTs with an FEV1 of 41% and a NiOX of 75.  He has no emphysema on a recent CT to suggest this is COPD, and his symptoms are more consistent with asthma with sensitivities to allergies and dust and daily vacilations of symptoms.  He needs significantly more medical therapy and closer monitoring by our office.  He is at high risk of morbidity from this process, and recently had a hospitalization from wheezing.  I suspect he suppressed his symptoms with smoking, however this is not the best long term solution to asthma control.     1. Asthma control - He has symbicort, but is not using it.  This should be used 2 puffs twice a day regardless of how he feels.  Will also add singulair and an OTC antihistamine to his regimen.  Will recheck his NiOX on his next visit to gauge the effectiveness of his ICS to see if he needs more.  2. Asthma action when ill - he has a nebulizer with duonebs for as needed use.  In time he may not need this everyday.  He should carry his albuterol MDI everywhere he goes.  I have also filled out an asthma action plan and refilled his medrol dose pack for use if his controlling medications are not working  3. Technique- I supplied a new spacer and reeducated him on its use and how it works.  I also encouraged him to rinse his mouth when done with the inhaled medicines, particularly the symbicort.  4. RTC in 2 months      CCx: cough    HPI: Mr. Broderick is a 39 year old male with a history of presumed asthma who presents for evaluation.  He thinks his breathing problems have gotten worse since he quit smoking about 6 months ago.  He notes he gets short of breath with wheezing and coughing episodically.  He was suspected of having asthma and was given symbicort by his primary doctor which he  thinks helped, but he stopped using it because he didn't think he needed it.  Now he uses therapy based on how he feels.  Every morning he feels tight so uses his duoneb.  If he still feels bad, he will take a single solumedrol from a dose chago he didn't complete.  He is worried because he is out of that medication now.  He has an albuterol MDI, but isn't sure it helps a lot.  He had a spacer, but also wasn't sure it was working so he may have lost it.    He describes frequent nasal stuffiness, but doesn't like nasal sprays.  He takes claritin as needed but isn't on it now.  He does feel his breathing is worse when his allergies are active and also after working all day in the [employer].  He wears a mask at work with some benefit.    He was hospitalized with his asthma in February for a night.  He was given prednisone which he says helps every time.      PMH:  Past Medical History:   Diagnosis Date     Ankylosis Of The Right Foot     Created by Conversion      Helicobacter Pylori (H. Pylori) Infection     Created by Conversion      Morbid obesity (H)     Created by Conversion    asthma    PSH:  Past Surgical History:   Procedure Laterality Date     INGUINAL HERNIA REPAIR Right        SH:  Social History     Socioeconomic History     Marital status:      Spouse name: Not on file     Number of children: Not on file     Years of education: Not on file     Highest education level: Not on file   Occupational History     Not on file   Social Needs     Financial resource strain: Not on file     Food insecurity:     Worry: Not on file     Inability: Not on file     Transportation needs:     Medical: Not on file     Non-medical: Not on file   Tobacco Use     Smoking status: Former Smoker     Last attempt to quit: 2018     Years since quittin.6     Smokeless tobacco: Never Used   Substance and Sexual Activity     Alcohol use: Yes     Alcohol/week: 8.4 oz     Types: 14 Cans of beer per week     Drug  use: No     Sexual activity: Yes     Partners: Female   Lifestyle     Physical activity:     Days per week: Not on file     Minutes per session: Not on file     Stress: Not on file   Relationships     Social connections:     Talks on phone: Not on file     Gets together: Not on file     Attends Orthodox service: Not on file     Active member of club or organization: Not on file     Attends meetings of clubs or organizations: Not on file     Relationship status: Not on file     Intimate partner violence:     Fear of current or ex partner: Not on file     Emotionally abused: Not on file     Physically abused: Not on file     Forced sexual activity: Not on file   Other Topics Concern     Not on file   Social History Narrative     Not on file       Family history:  Family History   Problem Relation Age of Onset     Snoring Brother      The family history was reviewed and is not pertinent to the chief complaint or HPI.    ROS:  Review of Systems - History obtained from the patient  General ROS: negative  Psychological ROS: negative  ENT ROS: negative  Allergy and Immunology ROS: negative  Endocrine ROS: negative  Respiratory ROS: positive for - cough, pleuritic pain, shortness of breath, sputum changes and wheezing  negative for - hemoptysis or orthopnea  Cardiovascular ROS: no chest pain or palpitations  Gastrointestinal ROS: no abdominal pain, change in bowel habits, or black or bloody stools  Genito-Urinary ROS: no dysuria, trouble voiding, or hematuria  Musculoskeletal ROS: negative  Neurological ROS: no TIA or stroke symptoms  Dermatological ROS: negative      Current Meds:  Current Outpatient Medications   Medication Sig     albuterol (PROAIR HFA;PROVENTIL HFA;VENTOLIN HFA) 90 mcg/actuation inhaler Inhale 2 puffs every 4 (four) hours as needed for wheezing or shortness of breath.     amLODIPine (NORVASC) 5 MG tablet Take 1 tablet (5 mg total) by mouth daily.     budesonide-formoterol (SYMBICORT) 160-4.5  mcg/actuation inhaler Inhale 2 puffs 2 (two) times a day.     ipratropium-albuterol (DUO-NEB) 0.5-2.5 mg/3 mL nebulizer USE 3 ML VIA NEBULIZER FOUR TIMES DAILY     methylPREDNISolone (MEDROL DOSEPACK) 4 mg tablet follow package directions     oxyCODONE (ROXICODONE) 5 MG immediate release tablet 1 TO 2 TABLETS EVERY 8 HOURS BREAKTHROUGH RIB PAIN     triamterene-hydrochlorothiazide (DYAZIDE) 37.5-25 mg per capsule Take 1 capsule by mouth daily.     valsartan-hydrochlorothiazide (DIOVAN HCT) 160-25 mg per tablet Take 1 tablet by mouth daily.     montelukast (SINGULAIR) 10 mg tablet Take 1 tablet (10 mg total) by mouth at bedtime.       Labs:  Recent Results (from the past 72 hour(s))   POCT hemoglobin   Result Value Ref Range    Hgb 15.3 7.0 g/dL       I have personally reviewed all imaging and PFT data available pertinent to this visit.    Imaging studies:  Xr Chest 2 Views    Result Date: 2/27/2019  XR CHEST 2 VIEWS 2/27/2019 10:48 AM INDICATION: Sob COMPARISON: 01/11/2019 FINDINGS: Minimal change. There is very slight central interstitial prominence suggesting minimal bronchial wall thickening. No focal pneumonia or pleural effusion. Heart size normal.    Ct Chest Without Contrast    Result Date: 2/27/2019  [facility] RADIOLOGY EXAM: CT CHEST WO CONTRAST LOCATION: [facility] DATE/TIME: 2/27/2019 6:01 PM INDICATION: Shortness of breath persistent wheezing, reactive airway disease, wondering about possiblity of parenchymal lung disease COMPARISON: Chest x-ray the same date TECHNIQUE: Helical images were obtained through the chest. Multiplanar reformats were obtained. Dose reduction techniques were used. IV CONTRAST: None. FINDINGS: LUNGS AND PLEURA: The central airways are clear. No focal consolidation or pleural effusion. No pulmonary mass or suspicious nodule. MEDIASTINUM: Prominent bilateral axillary as well as mediastinal lymph nodes which are likely reactive. A dominant anterior paratracheal node measures  "8 mm short axis dimension. Normal heart size. Trace pericardial fluid. LIMITED UPPER ABDOMEN: Negative. MUSCULOSKELETAL: Multiple old healed right-sided rib fractures. There are also healing displaced fractures of the left 10th and 11th ribs.     CONCLUSION: 1.  No acute pulmonary parenchymal findings. No focal pneumonic infiltrate or pleural effusion. 2.  Healing displaced fractures of the left 10th and 11th ribs. Old healed right-sided rib fractures are also noted.       PFTs:  FEV1/FVC is 55% and is reduced.  FEV1 is 1.33L (41%) predicted and is reduced.  FVC is 2.40L (63%) predicted and reduced.  There was improvement in spirometry after a single inhaled dose of bronchodilator.  TLC is 6.12L (103%) predicted and is normal.  RV is 3.27L (187%) predicted and is increased.  DLCO is 23.17ml/min/hg (75%) predicted and is reduced when it is corrected for hemoglobin.  Flow volume loops indicate obstruction.    Impression:  Full Pulmonary Function Test is abnormal.  PFTs are consistent with severe obstructive disease.  Spirometry is consistent with reversibility.  There is no hyperinflation.  There is air-trapping.  Diffusion capacity when corrected for hemoglobin is mildly reduced.    Aaron Davila  [facility]          Physical Exam:  /62   Pulse 96   Resp 12   Ht 5' 4\" (1.626 m)   Wt (!) 263 lb (119.3 kg)   SpO2 95%   BMI 45.14 kg/m    General - Well nourished, obese  Ears/Mouth - OP pink moist, no thrush  Neck - no cervical lymphadenopathy  Lungs - Clear to ausculation bilaterally   CVS - regular rhythm with no murmurs, rubs or gallups  Abdomen - soft, NT, ND, NABS  Ext - no cyanosis, clubbing or edema  Skin - no rash  Psychology - alert and oriented, answers appropriate        Electronically signed by:    Aaron Davila MD PhD  [facility] Pulmonary and Critical Care Medicine  "

## 2021-05-31 NOTE — TELEPHONE ENCOUNTER
Placido was left a message to call back and set up an about two month follow up appointment with dr. Davila.

## 2021-06-01 ENCOUNTER — RECORDS - HEALTHEAST (OUTPATIENT)
Dept: FAMILY MEDICINE | Facility: CLINIC | Age: 41
End: 2021-06-01

## 2021-06-01 VITALS — HEIGHT: 63 IN | WEIGHT: 267 LBS | BODY MASS INDEX: 47.31 KG/M2

## 2021-06-02 VITALS — HEIGHT: 63 IN | WEIGHT: 263 LBS | BODY MASS INDEX: 46.6 KG/M2

## 2021-06-02 VITALS — BODY MASS INDEX: 45.74 KG/M2 | BODY MASS INDEX: 47.58 KG/M2 | WEIGHT: 268.6 LBS | WEIGHT: 266.5 LBS

## 2021-06-02 VITALS — WEIGHT: 268.6 LBS | BODY MASS INDEX: 47.58 KG/M2

## 2021-06-02 VITALS — WEIGHT: 266 LBS | HEIGHT: 64 IN | BODY MASS INDEX: 45.41 KG/M2

## 2021-06-02 VITALS — BODY MASS INDEX: 47.3 KG/M2 | WEIGHT: 267 LBS

## 2021-06-02 VITALS — WEIGHT: 268 LBS | BODY MASS INDEX: 46 KG/M2

## 2021-06-02 VITALS — BODY MASS INDEX: 46.59 KG/M2 | WEIGHT: 263 LBS

## 2021-06-02 NOTE — PROGRESS NOTES
Assessment and Plan:Placido Broderick is a 39 y.o. M with a past medical history significant for severe persistent asthma who presents to clinic today for follow up.  Unfortunately Mr. Broderick stopped his symbicort a month after I saw him and restarted smoking.  He did have another exacerbation requiring a medrol dose chago.  He has since requit smoking.  I spent time with him discussing how symbicort worked, and given the severity of his asthma, I would recommend he stay on this for the forseeable future.  He denies issue with insurance coverage.    1) Asthma exacerbation - completed medrol dose chago, refilled in case of another exacerbation in the future.  Avoid all tobacco smoke as this was likely a trigger.  2) Asthma control - needs to stay on his ICS/LABA with the symbicort.  Rarely needed albuterol while on the symbicort.  Plan to recheck his NIOX and spirometry while on these agents to prove he is getting benefit and reinforce taking it.  Remain on singulair as well.  3) RTC in 2 months to continue to reinforce adherence to his regimen           CCx: asthma    HPI: Mr. Broderick is a 39 year old male who presents for follow up of his asthma.  Since I last saw him he states he took the symbicort for a month and then stopped it thinking he didn't need it anymore.  He also started smoking again and then developed an exacerbation a couple of weeks ago.  He took a medrol dose chago and stopped smoking and feels more or less back to normal today.  He is not using his symbicort still, and hasn't used his albuterol in a while.  He does take singulair everynight.  He has no issues paying for the symbicort.      ROS:  Review of Systems - History obtained from the patient  General ROS: negative  Psychological ROS: negative  ENT ROS: negative  Allergy and Immunology ROS: negative  Endocrine ROS: negative  Respiratory ROS: positive for - cough, shortness of breath and wheezing  negative for - hemoptysis, orthopnea or pleuritic  pain  Cardiovascular ROS: no chest pain or palpitations  Gastrointestinal ROS: no abdominal pain, change in bowel habits, or black or bloody stools  Genito-Urinary ROS: no dysuria, trouble voiding, or hematuria  Musculoskeletal ROS: negative  Neurological ROS: no TIA or stroke symptoms  Dermatological ROS: negative      Current Meds:  Current Outpatient Medications   Medication Sig     albuterol (PROAIR HFA;PROVENTIL HFA;VENTOLIN HFA) 90 mcg/actuation inhaler Inhale 2 puffs every 4 (four) hours as needed for wheezing or shortness of breath.     amLODIPine (NORVASC) 5 MG tablet Take 1 tablet (5 mg total) by mouth daily.     budesonide-formoterol (SYMBICORT) 160-4.5 mcg/actuation inhaler Inhale 2 puffs 2 (two) times a day.     ipratropium-albuterol (DUO-NEB) 0.5-2.5 mg/3 mL nebulizer USE 3 ML VIA NEBULIZER FOUR TIMES DAILY     methylPREDNISolone (MEDROL DOSEPACK) 4 mg tablet follow package directions     montelukast (SINGULAIR) 10 mg tablet Take 1 tablet (10 mg total) by mouth at bedtime.     triamterene-hydrochlorothiazide (DYAZIDE) 37.5-25 mg per capsule Take 1 capsule by mouth daily.     methylPREDNISolone (MEDROL DOSEPACK) 4 mg tablet Follow package directions       Labs:  No results found for this or any previous visit (from the past 72 hour(s)).    I have personally reviewed all pertinent imaging studies and PFT results unless otherwise noted.    Imaging studies:  Xr Chest 2 Views    Result Date: 2/27/2019  XR CHEST 2 VIEWS 2/27/2019 10:48 AM INDICATION: Sob COMPARISON: 01/11/2019 FINDINGS: Minimal change. There is very slight central interstitial prominence suggesting minimal bronchial wall thickening. No focal pneumonia or pleural effusion. Heart size normal.    Ct Chest Without Contrast    Result Date: 2/27/2019  [facility] RADIOLOGY EXAM: CT CHEST WO CONTRAST LOCATION: [facility] DATE/TIME: 2/27/2019 6:01 PM INDICATION: Shortness of breath persistent wheezing, reactive airway disease, wondering about  possiblity of parenchymal lung disease COMPARISON: Chest x-ray the same date TECHNIQUE: Helical images were obtained through the chest. Multiplanar reformats were obtained. Dose reduction techniques were used. IV CONTRAST: None. FINDINGS: LUNGS AND PLEURA: The central airways are clear. No focal consolidation or pleural effusion. No pulmonary mass or suspicious nodule. MEDIASTINUM: Prominent bilateral axillary as well as mediastinal lymph nodes which are likely reactive. A dominant anterior paratracheal node measures 8 mm short axis dimension. Normal heart size. Trace pericardial fluid. LIMITED UPPER ABDOMEN: Negative. MUSCULOSKELETAL: Multiple old healed right-sided rib fractures. There are also healing displaced fractures of the left 10th and 11th ribs.     CONCLUSION: 1.  No acute pulmonary parenchymal findings. No focal pneumonic infiltrate or pleural effusion. 2.  Healing displaced fractures of the left 10th and 11th ribs. Old healed right-sided rib fractures are also noted.         Physical Exam:  /88   Pulse (!) 104   Resp 24   Wt (!) 264 lb 1.6 oz (119.8 kg)   SpO2 96% Comment: RA  BMI 45.33 kg/m    General - Well nourished  Ears/Mouth -  OP pink moist, no thrush  Neck - no cervical lymphadenopathy  Lungs - Clear to ausculation bilaterally   CVS - regular rhythm with no murmurs, rubs or gallups  Abdomen - soft, NT, ND, NABS  Ext - no cyanosis, clubbing or edema  Skin - no rash  Psychology - alert and oriented, answers appropriate        Electronically signed by:    Aaron Davila MD PhD  [facility] Pulmonary and Critical Care Medicine

## 2021-06-02 NOTE — PATIENT INSTRUCTIONS - HE
1) I would stay on the symbicort, I think your lungs are bad enough to warrant staying on this.    2) I would like to ensure that this is working, so please stay on this for the two months   3) I would also stay away from tobacco entirely  4) Come back in 2 months to check back in , we will recheck your NIOX and spirometry then  5) I think a flu shot is important for you, lets do it today

## 2021-06-03 VITALS
SYSTOLIC BLOOD PRESSURE: 138 MMHG | OXYGEN SATURATION: 96 % | BODY MASS INDEX: 45.33 KG/M2 | RESPIRATION RATE: 24 BRPM | HEART RATE: 104 BPM | DIASTOLIC BLOOD PRESSURE: 88 MMHG | WEIGHT: 264.1 LBS

## 2021-06-03 VITALS — WEIGHT: 259 LBS | HEIGHT: 64 IN | BODY MASS INDEX: 44.22 KG/M2

## 2021-06-03 VITALS — WEIGHT: 263 LBS | BODY MASS INDEX: 44.9 KG/M2 | HEIGHT: 64 IN

## 2021-06-03 VITALS — WEIGHT: 248 LBS | BODY MASS INDEX: 42.57 KG/M2

## 2021-06-04 VITALS
HEIGHT: 64 IN | DIASTOLIC BLOOD PRESSURE: 80 MMHG | WEIGHT: 276 LBS | BODY MASS INDEX: 47.12 KG/M2 | SYSTOLIC BLOOD PRESSURE: 130 MMHG | HEART RATE: 115 BPM | OXYGEN SATURATION: 92 %

## 2021-06-04 NOTE — PATIENT INSTRUCTIONS - HE
1) For the symbicort to work, you need to use two puffs twice a day.  This will save you from needing prednisone or albuterol  2) I will get you some prednisone to use if the symbicort isn't working  3) Its really important that you stay away from the smoking.

## 2021-06-04 NOTE — PROGRESS NOTES
Assessment and Plan:Placido Broderick is a 39 y.o. M with a past medical history significant for severe persistent asthma who presents to clinic today for follow up.  He continues to have challenges taking his symbicort routinely and quitting smoking.  He is not under great control, but I'm convinced he could do much better with adherence to his symbicort.  His NIOX is 66 today, and a full dose of regular ICS use could go a long way.      1) Severe persistent asthma maintenance - stay on Symbicort 160/4.5 2 puffs twice a day, regardless of how he feels.  Albuterol as needed and singulair every night.  2) Asthma exacerbation  - needs to use steroids as prescribed rather than one pill here or there.  Prescribed a taper for his current exacerbation, although he may be able to recover himself by simply taking his symbicort as prescribed.  He is not wheezing today.  3) Tobacco dependence - recommend he stop smoking for good.  This on and off of tobacco addiction is going to induced asthma exacerbations regardless of adherence to his medications.  4) RTC in 3 months           CCx: asthma    HPI: Mr. Broderick is a 39 year old male with severe persistent asthma who returns for follow up.  Since I saw him last he has again restarted and stopped smoking.  He quit 5 days ago.  He also stopped using his symbicort for a week because he felt fine, but then restarted it recently when he felt worse.  He is using it once a day.  He is coughing and wheezing more than usual.  He notes his symptoms aren't persistent and they come and go.  He takes a single solumedrol pill here and there depending on his symptoms, this is out of a dose chago he was given a while back.  He states he never takes all of the pills in a dose pack as he likes to save them.    ROS:  Review of Systems - History obtained from the patient  General ROS: negative  Psychological ROS: negative  ENT ROS: negative  Allergy and Immunology ROS: negative  Endocrine ROS:  negative  Respiratory ROS: positive for - cough, shortness of breath and wheezing  negative for - hemoptysis, orthopnea or pleuritic pain.  Cardiovascular ROS: no chest pain or palpitations  Gastrointestinal ROS: no abdominal pain, change in bowel habits, or black or bloody stools  Genito-Urinary ROS: no dysuria, trouble voiding, or hematuria  Musculoskeletal ROS: negative  Neurological ROS: no TIA or stroke symptoms  Dermatological ROS: negative      Current Meds:  Current Outpatient Medications   Medication Sig     albuterol (PROAIR HFA;PROVENTIL HFA;VENTOLIN HFA) 90 mcg/actuation inhaler Inhale 2 puffs every 4 (four) hours as needed for wheezing or shortness of breath.     amLODIPine (NORVASC) 5 MG tablet Take 1 tablet (5 mg total) by mouth daily.     budesonide-formoterol (SYMBICORT) 160-4.5 mcg/actuation inhaler Inhale 2 puffs 2 (two) times a day.     ipratropium-albuterol (DUO-NEB) 0.5-2.5 mg/3 mL nebulizer USE 3 ML VIA NEBULIZER FOUR TIMES DAILY     methylPREDNISolone (MEDROL DOSEPACK) 4 mg tablet follow package directions     montelukast (SINGULAIR) 10 mg tablet Take 1 tablet (10 mg total) by mouth at bedtime.     predniSONE (DELTASONE) 10 mg tablet Take 40 mg by mouth daily for 3 days, THEN 30 mg daily for 3 days, THEN 20 mg daily for 3 days, THEN 10 mg daily for 3 days.       Labs:  No results found for this or any previous visit (from the past 72 hour(s)).    I have personally reviewed all pertinent imaging studies and PFT results unless otherwise noted.    Imaging studies:  Xr Chest 2 Views    Result Date: 2/27/2019  XR CHEST 2 VIEWS 2/27/2019 10:48 AM INDICATION: Sob COMPARISON: 01/11/2019 FINDINGS: Minimal change. There is very slight central interstitial prominence suggesting minimal bronchial wall thickening. No focal pneumonia or pleural effusion. Heart size normal.    Ct Chest Without Contrast    Result Date: 2/27/2019  [facility] RADIOLOGY EXAM: CT CHEST WO CONTRAST LOCATION: [facility]  "DATE/TIME: 2/27/2019 6:01 PM INDICATION: Shortness of breath persistent wheezing, reactive airway disease, wondering about possiblity of parenchymal lung disease COMPARISON: Chest x-ray the same date TECHNIQUE: Helical images were obtained through the chest. Multiplanar reformats were obtained. Dose reduction techniques were used. IV CONTRAST: None. FINDINGS: LUNGS AND PLEURA: The central airways are clear. No focal consolidation or pleural effusion. No pulmonary mass or suspicious nodule. MEDIASTINUM: Prominent bilateral axillary as well as mediastinal lymph nodes which are likely reactive. A dominant anterior paratracheal node measures 8 mm short axis dimension. Normal heart size. Trace pericardial fluid. LIMITED UPPER ABDOMEN: Negative. MUSCULOSKELETAL: Multiple old healed right-sided rib fractures. There are also healing displaced fractures of the left 10th and 11th ribs.     CONCLUSION: 1.  No acute pulmonary parenchymal findings. No focal pneumonic infiltrate or pleural effusion. 2.  Healing displaced fractures of the left 10th and 11th ribs. Old healed right-sided rib fractures are also noted.         Physical Exam:  /80   Pulse (!) 115   Ht 5' 4\" (1.626 m)   Wt (!) 276 lb (125.2 kg)   SpO2 92%   BMI 47.38 kg/m    General - Well nourished  Ears/Mouth -  OP pink moist, no thrush  Neck - no cervical lymphadenopathy  Lungs - Clear to ausculation bilaterally   CVS - regular rhythm with no murmurs, rubs or gallups  Abdomen - soft, NT, ND, NABS  Ext - no cyanosis, clubbing or edema  Skin - no rash  Psychology - alert and oriented, answers appropriate        Electronically signed by:    Aaron Davila MD PhD  [facility] Pulmonary and Critical Care Medicine  "

## 2021-06-05 VITALS
TEMPERATURE: 97.9 F | HEIGHT: 63 IN | HEART RATE: 105 BPM | WEIGHT: 281.8 LBS | OXYGEN SATURATION: 96 % | SYSTOLIC BLOOD PRESSURE: 148 MMHG | BODY MASS INDEX: 49.93 KG/M2 | DIASTOLIC BLOOD PRESSURE: 92 MMHG

## 2021-06-05 VITALS — HEIGHT: 64 IN | BODY MASS INDEX: 47.12 KG/M2 | WEIGHT: 276 LBS

## 2021-06-05 VITALS — WEIGHT: 281 LBS | BODY MASS INDEX: 47.97 KG/M2 | HEIGHT: 64 IN

## 2021-06-05 NOTE — TELEPHONE ENCOUNTER
Left message to call back for: Patient  Information to relay to patient:  Please inform patient to go ahead and drop off his FMLA forms or fax them to us at [phone]. Once the forms are received we will let him know if Dr. Bess needs to see him or if he can fill them out without a visit. It looks like FMLA forms were filled out last year 2019.    FARHEEN/ALECIA

## 2021-06-05 NOTE — TELEPHONE ENCOUNTER
Who is calling:  Patient   Reason for Call:  Patient states he is requesting  FMLA for his work due to his asthma patient is questioning whether he needs to see provider or can he drop off the FMLA form at clinic . Please advise . Please call patient with information .  Date of last appointment with primary care: unknown   Okay to leave a detailed message: No

## 2021-06-05 NOTE — TELEPHONE ENCOUNTER
Medication Request  Medication name: Nebulizer machine  Requested Pharmacy: Patient's spouse is willing to pick it up from any location, just would like the machine ASA  Reason for request: The machine the patient had gotten previously from an urgent care is no longer working properly. The patient's spouse said that they called the patient's lung doctor but was not able to reach a live person so they called PCP office.  When did you use medication last?:  unknown  Patient offered appointment:  patient declined  Okay to leave a detailed message: yes

## 2021-06-06 NOTE — PATIENT INSTRUCTIONS - HE
1) refill all meds so he doesn't run out during pandemic, better to go now than in the future to refill

## 2021-06-06 NOTE — PROGRESS NOTES
"Placido Broderick is a 39 y.o. male who is being evaluated via a billable telephone visit.      The patient has been notified of following:     \"This telephone visit will be conducted via a call between you and your physician/provider. We have found that certain health care needs can be provided without the need for a physical exam.  This service lets us provide the care you need with a short phone conversation.  If a prescription is necessary we can send it directly to your pharmacy.  If lab work is needed we can place an order for that and you can then stop by our lab to have the test done at a later time.    If during the course of the call the physician/provider feels a telephone visit is not appropriate, you will not be charged for this service.\"     Placido Broderick complains of  No chief complaint on file.      I have reviewed and updated the patient's Past Medical History, Social History, Family History and Medication List.    ALLERGIES  Hydrocodone-acetaminophen      Additional provider notes:  Mr Broderick has severe persistent asthma.  He states his breathing is much better since he quit smoking and lost 10 pounds.  He remains on symbicort with as needed duoneb/albuterol.  He would like refills.  He also wants a course of prednisone incase he gets sick.    Assessment/Plan:    1. Severe persistent asthma with exacerbation  budesonide-formoteroL (SYMBICORT) 160-4.5 mcg/actuation inhaler   2. Reactive airway disease with wheezing, moderate persistent, uncomplicated  methylPREDNISolone (MEDROL DOSEPACK) 4 mg tablet    ipratropium-albuteroL (DUO-NEB) 0.5-2.5 mg/3 mL nebulizer          I have reviewed the note as documented above.  This accurately captures the substance of my conversation with the patient.      Phone call contact time    Call Started at: 1000  Call Ended at: 1010      Signature:  Aaron Davila MD PhD  [facility]     "

## 2021-06-07 NOTE — TELEPHONE ENCOUNTER
Refill Approved    Rx renewed per Medication Renewal Policy. Medication was last renewed on 05/25/19.    Dana Andersen, [facility] Triage/Med Refill 3/20/2020     Requested Prescriptions   Pending Prescriptions Disp Refills     amLODIPine (NORVASC) 5 MG tablet [Pharmacy Med Name: AMLODIPINE BESYLATE 5MG TABLETS] 90 tablet 3     Sig: TAKE 1 TABLET BY MOUTH DAILY       Calcium-Channel Blockers Protocol Passed - 3/19/2020  8:53 AM        Passed - PCP or prescribing provider visit in past 12 months or next 3 months     Last office visit with prescriber/PCP: 5/3/2019 Harry Bess MD OR same dept: 5/3/2019 Harry Bess MD OR same specialty: 5/3/2019 Harry Bess MD  Last physical: Visit date not found Last MTM visit: Visit date not found   Next visit within 3 mo: Visit date not found  Next physical within 3 mo: Visit date not found  Prescriber OR PCP: Harry Bess MD  Last diagnosis associated with med order: 1. Benign essential HTN  - amLODIPine (NORVASC) 5 MG tablet [Pharmacy Med Name: AMLODIPINE BESYLATE 5MG TABLETS]; TAKE 1 TABLET BY MOUTH DAILY  Dispense: 90 tablet; Refill: 3    If protocol passes may refill for 12 months if within 3 months of last provider visit (or a total of 15 months).             Passed - Blood pressure filed in past 12 months     BP Readings from Last 1 Encounters:   01/06/20 130/80

## 2021-06-08 NOTE — PROGRESS NOTES
Lo called to request a order for his Medrol pac. Said he is starting to have some shortness of breath and is out of them. Instructed to complete package of prednisone and see if it is not helpful to call me with an update at that time.

## 2021-06-09 NOTE — PROGRESS NOTES
"Placido Broderick is a 40 y.o. male who is being evaluated via a billable telephone visit.      The patient has been notified of following:     \"This telephone visit will be conducted via a call between you and your physician/provider. We have found that certain health care needs can be provided without the need for a physical exam.  This service lets us provide the care you need with a short phone conversation.  If a prescription is necessary we can send it directly to your pharmacy.  If lab work is needed we can place an order for that and you can then stop by our lab to have the test done at a later time.    Telephone visits are billed at different rates depending on your insurance coverage. During this emergency period, for some insurers they may be billed the same as an in-person visit.  Please reach out to your insurance provider with any questions.    If during the course of the call the physician/provider feels a telephone visit is not appropriate, you will not be charged for this service.\"    Patient has given verbal consent to a Telephone visit? Yes    What phone number would you like to be contacted at? [phone]    Patient would like to receive their AVS by AVS Preference: Prachi.    Additional provider notes: telephone      Problem List Items Addressed This Visit     Severe persistent asthma without complication     Symbicort 2 puffs twice daily  Singulair 10 mg at bedtime    Rescue medication albuterol    Methylprednisolone if worsening symptoms    Tobacco is likely a trigger according to Dr. Davila    Symptoms include cough shortness of breath wheezing  Chest CT showed no focal pneumonias  Some rib fractures      Persistent asthma  Controller medication Symbicort Singulair  Avoid tobacco  Medrol Dosepak for severe exacerbations  Albuterol for rescue medication  Paperwork filled out today FMLA             Relevant Medications    methylPREDNISolone (MEDROL DOSEPACK) 4 mg tablet      Other Visit Diagnoses  "    Reactive airway disease with wheezing, moderate persistent, uncomplicated        Relevant Medications    methylPREDNISolone (MEDROL DOSEPACK) 4 mg tablet            Assessment/Plan:  1. Reactive airway disease with wheezing, moderate persistent, uncomplicated  Persistent asthma  Controller medication Symbicort Singulair  Avoid tobacco  Medrol Dosepak for severe exacerbations  Albuterol for rescue medication  Paperwork filled out today FMLA  - methylPREDNISolone (MEDROL DOSEPACK) 4 mg tablet; follow package directions  Dispense: 21 tablet; Refill: 1      Blood pressure check at least once a year  Follow through with sleep medicine  Phone call duration:  8:04  - 8:23   19 minutes    Harry Bess MD

## 2021-06-09 NOTE — TELEPHONE ENCOUNTER
[place] paperwork was filled out at the time of patients virtual visit with Dr. Bess on 07/10/20. Reached out to patient to provide me with a fax number to fax the forms.    Patient provided me with a fax number, Employee ID # and case # to write on the from. I wrote the following information on the form and faxed the form. No further action required.     FAX: [phone]  Employee ID#: [id number]  Case #: [id number]    FARHEEN/ALECIA

## 2021-06-10 NOTE — TELEPHONE ENCOUNTER
RN cannot approve Refill Request    RN can NOT refill this medication med is not covered by policy/route to provider. Last office visit: 5/3/2019 Harry Bess MD Last Physical: Visit date not found Last MTM visit: Visit date not found Last visit same specialty: 5/3/2019 Harry Bess MD.  Next visit within 3 mo: Visit date not found  Next physical within 3 mo: Visit date not found      Sherlyn Steiner, [facility] Triage/Med Refill 7/30/2020    Requested Prescriptions   Pending Prescriptions Disp Refills     methylPREDNISolone (MEDROL DOSEPACK) 4 mg tablet [Pharmacy Med Name: METHYLPREDNISOLONE 4MG DOSPAK 21S] 21 tablet 1     Sig: TAKE AS DIRECTED       There is no refill protocol information for this order        amLODIPine (NORVASC) 5 MG tablet [Pharmacy Med Name: AMLODIPINE BESYLATE 5MG TABLETS] 30 tablet 0     Sig: TAKE 1 TABLET(5 MG) BY MOUTH DAILY       Calcium-Channel Blockers Protocol Passed - 7/29/2020  3:52 AM        Passed - PCP or prescribing provider visit in past 12 months or next 3 months     Last office visit with prescriber/PCP: 5/3/2019 Harry Bess MD OR same dept: Visit date not found OR same specialty: 5/3/2019 Harry Bess MD  Last physical: Visit date not found Last MTM visit: Visit date not found   Next visit within 3 mo: Visit date not found  Next physical within 3 mo: Visit date not found  Prescriber OR PCP: Harry Bess MD  Last diagnosis associated with med order: 1. Reactive airway disease with wheezing, moderate persistent, uncomplicated  - methylPREDNISolone (MEDROL DOSEPACK) 4 mg tablet [Pharmacy Med Name: METHYLPREDNISOLONE 4MG DOSPAK 21S]; TAKE AS DIRECTED  Dispense: 21 tablet; Refill: 1    2. Benign essential HTN  - amLODIPine (NORVASC) 5 MG tablet [Pharmacy Med Name: AMLODIPINE BESYLATE 5MG TABLETS]; TAKE 1 TABLET(5 MG) BY MOUTH DAILY  Dispense: 30 tablet; Refill: 0    If protocol passes may refill for 12 months if within 3 months of last provider visit  (or a total of 15 months).             Passed - Blood pressure filed in past 12 months     BP Readings from Last 1 Encounters:   01/06/20 130/80

## 2021-06-11 NOTE — PATIENT INSTRUCTIONS - HE
1) symbicort should be twice a day rather than once a day  2) Really happy you stopped smoking, keep up the good work  3) We will call you for an appt in 4-5 months

## 2021-06-11 NOTE — PROGRESS NOTES
"Placido Broderick is a 40 y.o. male who is being evaluated via a billable video visit.      The patient has been notified of following:     \"This video visit will be conducted via a call between you and your physician/provider. We have found that certain health care needs can be provided without the need for an in-person physical exam.  This service lets us provide the care you need with a video conversation.  If a prescription is necessary we can send it directly to your pharmacy.  If lab work is needed we can place an order for that and you can then stop by our lab to have the test done at a later time.    Video visits are billed at different rates depending on your insurance coverage. Please reach out to your insurance provider with any questions.    If during the course of the call the physician/provider feels a video visit is not appropriate, you will not be charged for this service.\"    Patient has given verbal consent to a Video visit? Yes  How would you like to obtain your AVS? AVS Preference: Cover L[employer]creen[employer].  If dropped by the video visit, the video invitation should be sent to: Text to cell phone: [phone]  Will anyone else be joining your video visit? No        Video Start Time: 1300    Additional provider notes: exam  GENERAL: Healthy, alert and no distress  EYES: Eyes grossly normal to inspection. No discharge or erythema, or obvious scleral/conjunctival abnormalities.  RESP: No audible wheeze, cough, or visible cyanosis.  No visible retractions or increased work of breathing.    NEURO: Cranial nerves grossly intact. Mentation and speech appropriate for age.  PSYCH: Mentation appears normal, affect normal/bright, judgement and insight intact, normal speech and appearance well-groomed  Placido Broderick is a 40 year old male with a history of severe persistent asthma calling about a follow up.  Since I last saw him he feels he is doing pretty good.  He has not used his albuterol in over a month.  He is using his symbicort " once a day and singulair in the evening. He quit smoking a month ago and is hopeful he wont restart.  He continues to work at the [employer] but now is wearing a mask and thinks this is protecting him from the delia environment there.    Plan  1) Symbicort should be twice a day, not once a day  2) Continue prn albuterol  3) Continue daily singulair  4) I suspect most of his new stability is from stopping smoking, he must continue this to help his lungs resolve  5) RTC in 4-5    Video-Visit Details    Type of service:  Video Visit    Video End Time (time video stopped): 1:15 PM  Originating Location (pt. Location): Home    Distant Location (provider location):  [facility]     Platform used for Video Visit: Osvaldo Davila MD

## 2021-06-13 NOTE — TELEPHONE ENCOUNTER
Wife called to request another round of prednisone. Said his breathing id getting more difficult. Medrol chago ordered per his asthma action plan and instructed to call if no improvement

## 2021-06-13 NOTE — TELEPHONE ENCOUNTER
Refill Request  Did you contact pharmacy: No  Medication name:   Requested Prescriptions     Pending Prescriptions Disp Refills     amLODIPine (NORVASC) 5 MG tablet 30 tablet 3     Sig: Take 1 tablet (5 mg total) by mouth daily.     Who prescribed the medication: Dr. Bess  Requested Pharmacy: [facility]  Is patient out of medication: Unknown  Patient notified refills processed in 3 business days:  no  Okay to leave a detailed message: no

## 2021-06-13 NOTE — PROGRESS NOTES
"SUBJECTIVE: Placido Broderick is a 40 y.o. male with:  Chief Complaint   Patient presents with     Knee Pain      L. Onset 1 week ago. No injury/fall.     Elbow Pain     R. Onset several days ago    1) H first developed left knee pain 1 week ago.  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  No injury.  He does have some swelling.  Hurts to bend knee. Felt better after a few days off work.  Stairs are hard.  He is wearing a knee brace.    2) Had some right elbow pain.  Doing better now.    3) HTN - He does take amlodipine for blood pressure control.    OBJECTIVE: BP (!) 148/92 (Patient Site: Right Arm, Patient Position: Sitting, Cuff Size: Adult Large)   Pulse (!) 105   Temp 97.9  F (36.6  C) (Oral)   Ht 5' 3.39\" (1.61 m)   Wt (!) 281 lb 12.8 oz (127.8 kg)   SpO2 96%   BMI 49.31 kg/m   no distress  Knees: Mild swelling left knee but no erythema / deformity.  No tenderness.  Anterior drawer test with normal endpoint bilaterally.  Normal range of motion.  No varus or valgus laxity.  No patellar subluxation.      BP Readings from Last 3 Encounters:   11/20/20 (!) 148/92   01/06/20 130/80   10/07/19 138/88     Placido was seen today for knee pain and elbow pain.    Diagnoses and all orders for this visit:    Acute pain of left knee- I recommended xray but he declined at this time.  I will give him some work restrictions so he can rest the knee.  He can take Tylenol OTC for pain as needed.  He states he is supposed to see Harry Bess MD next week so can be rechecked then.  -     XR Knee Left 1 or 2 VWS; Future    Need for immunization against influenza  -     Influenza, Seasonal Quad, PF =/> 6months    Benign essential HTN - BP elevated but has been good in the past so observe for now and continue amlodipine.     Rajwinder Garza     "

## 2021-06-14 ENCOUNTER — AMBULATORY - HEALTHEAST (OUTPATIENT)
Dept: MULTI SPECIALTY CLINIC | Facility: CLINIC | Age: 41
End: 2021-06-14

## 2021-06-14 DIAGNOSIS — J45.51 SEVERE PERSISTENT ASTHMA WITH EXACERBATION (H): ICD-10-CM

## 2021-06-14 NOTE — PROGRESS NOTES
Placido Broderick is a 40 y.o. male who is being evaluated via a billable video visit.      How would you like to obtain your AVS? MyChart.  If dropped from the video visit, the video invitation should be resent by: Send to e-mail at: [email or web address]  Will anyone else be joining your video visit? No      Video Start Time: 1050  Assessment - Mr. Broderick is a 40 year old with severe persistent asthma who returns for follow up.  His change in asthma stability is remarkable.  When we first met he was smoking and using a lot of prednisone and albuterol.  He has since stopped smoking and is regularly taking his symbicort and singulair as prescribed.  He is no longer requiring as needed medications except once about three months ago.      Plan  Asthma - remain on singulair and symbicort with as needed albuterol MDI or neb   - refill prednisone for action plan use if he needs it  Tobacco abuse - remains tobacco free and this is the critical component to his asthma control    RTC in 1 year    Subjective     Placido Broderick is 40 y.o. and presents to clinic today for follow up of his asthma.  Since we last talked he has done remarkably well.  He last used his action plan 3 months ago, but since has not even needed his albuterol.  He is now using his symbicort twice a day instead of once a day and taking singulair at bedtime.  He is still tobacco free.  He has no complaints and would like a refill of prednisone in case he needs it.            Objective    Additional provider notes: GENERAL: Healthy, alert and no distress  EYES: Eyes grossly normal to inspection. No discharge or erythema, or obvious scleral/conjunctival abnormalities.  RESP: No audible wheeze, cough, or visible cyanosis.  No visible retractions or increased work of breathing.    NEURO: Cranial nerves grossly intact. Mentation and speech appropriate for age.  PSYCH: Mentation appears normal, affect normal/bright, judgement and insight intact, normal speech and appearance  well-groomed        Video-Visit Details    Type of service:  Video Visit    Video End Time (time video stopped): 11:14 AM  Originating Location (pt. Location): Home    Distant Location (provider location):  [facility]     Platform used for Video Visit: Osvaldo

## 2021-06-16 PROBLEM — F43.23 ADJUSTMENT DISORDER WITH MIXED ANXIETY AND DEPRESSED MOOD: Status: ACTIVE | Noted: 2018-06-07

## 2021-06-16 PROBLEM — I10 BENIGN ESSENTIAL HTN: Status: ACTIVE | Noted: 2019-02-27

## 2021-06-16 PROBLEM — J45.50 SEVERE PERSISTENT ASTHMA WITHOUT COMPLICATION (H): Status: ACTIVE | Noted: 2019-02-27

## 2021-06-16 PROBLEM — R00.0 TACHYCARDIA: Status: ACTIVE | Noted: 2019-02-27

## 2021-06-16 NOTE — TELEPHONE ENCOUNTER
Phone call from Placido. States his asthma has been acting up and is in need of using his action plan prednisone.  Will send orders for prednisone taper to his pharmacy.

## 2021-06-17 NOTE — PATIENT INSTRUCTIONS - HE
Patient Instructions by Tomy Thompson DO at 1/20/2019 12:10 PM     Author: Tomy Thompson DO Service: -- Author Type: Physician    Filed: 1/20/2019  1:11 PM Encounter Date: 1/20/2019 Status: Addendum    : Tomy Thompson DO (Physician)    Related Notes: Original Note by Tomy Thompson DO (Physician) filed at 1/20/2019  1:10 PM       Start you blood pressure medication today. Check you blood pressure at work, and be seen if it is not trending lower with treatment. Please follow up with our primary care provider to address today's multiple issues in 7-10 days. Be seen sooner if feeling worse.  Patient Education     Acute Bacterial Rhinosinusitis (ABRS)    Acute bacterial rhinosinusitis (ABRS) is an infection of your nasal cavity and sinuses. Its caused by bacteria. Acute means that youve had symptoms for less than 4 weeks, but possibly up to 12 weeks.  Understanding your sinuses  The nasal cavity is the large air-filled space behind your nose. The sinuses are a group of spaces formed by the bones of your face. They connect with your nasal cavity. ABRS causes the tissue lining these spaces to become inflamed. Mucus may not drain normally. This leads to facial pain and other symptoms.  What causes ABRS?  ABRS most often follows an upper respiratory infection caused by a virus. Bacteria then infect the lining of your nasal cavity and sinuses. But you can also get ABRS if you have:    Nasal allergies    Long-term nasal swelling and congestion not caused by allergies    Blockage in the nose  Symptoms of ABRS  The symptoms of ABRS may be different for each person and include:    Nasal congestion or blockage    Pain or pressure in the face    Thick, colored drainage from the nose  Other symptoms may include:    Runny nose    Fluid draining from the nose down the throat (postnasal drip)    Headache    Cough    Pain    Fever  Diagnosing ABRS  ABRS may be diagnosed if youve had an upper respiratory infection like a  cold and cough for 10 or more days without improvement or with worsening symptoms. Your healthcare provider will ask about your symptoms and your medical history. The provider will check your vital signs, including your temperature. Youll have a physical exam. The healthcare provider will check your ears, nose, and throat. You likely wont need any tests. If ABRS comes back, you may have a culture or other tests.  Treatment for ABRS  Treatment may include:    Antibiotic medicine. This is for symptoms that last for at least 10 to 14 days.    Nasal corticosteroid medicine. Drops or spray used in the nose can lessen swelling and congestion.    Over-the-counter pain medicine. This is to lessen sinus pain and pressure.    Nasal decongestant medicine. Spray or drops may help to lessen congestion. Do not use them for more than a few days.    Salt wash (saline irrigation). This can help to loosen mucus.  Possible complications of ABRS  ABRS may come back or become long-term (chronic). In rare cases, ABRS may cause complications such as:     Inflamed tissue around the brain and spinal cord (meningitis)    Inflamed tissue around the eyes (orbital cellulitis)    Inflamed bones around the sinuses (osteitis)  These problems may need to be treated in a hospital with intravenous (IV) antibiotic medicine or surgery.  When to call the healthcare provider  Call your healthcare provider if you have any of the following:    Symptoms that dont get better, or get worse    Symptoms that dont get better after 3 to 5 days on antibiotics    Trouble seeing    Swelling around your eyes    Confusion or trouble staying awake   Date Last Reviewed: 5/1/2017 2000-2017 [employer]. [address] [place] [address], [place] [address]. All rights reserved. This information is not intended as a substitute for professional medical care. Always follow your healthcare professional's instructions.           Patient Education     Viral or Bacterial  Bronchitis with Wheezing (Adult)    Bronchitis is an infection of the air passages. It often occurs during a cold and is usually caused by a virus. Symptoms include cough with mucus (phlegm) and low-grade fever. This illness is contagious during the first few days and is spread through the air by coughing and sneezing, or by direct contact (touching the sick person and then touching your own eyes, nose, or mouth).  If there is a lot of inflammation, air flow is restricted. The air passages may also go into spasm, especially if you have asthma. This causes wheezing and difficulty breathing even in people who do not have asthma.  Bronchitis usually lasts 7 to 14 days. The wheezing should improve with treatment during the first week. An inhaler is often prescribed to relax the air passages and stop wheezing. Antibiotics will be prescribed if your doctor thinks there is also a secondary bacterial infection.  Home care    If symptoms are severe, rest at home for the first 2 to 3 days. When you go back to your usual activities, don't let yourself get too tired.    Do not smoke. Also avoid being exposed to secondhand smoke.    You may use over-the-counter medicine to control fever or pain, unless another medicine was prescribed. Note: If you have chronic liver or kidney disease or have ever had a stomach ulcer or gastrointestinal bleeding, talk with your healthcare provider before using these medicines. Also talk to your provider if you are taking medicine to prevent blood clots.) Aspirin should never be given to anyone younger than 18 years of age who is ill with a viral infection or fever. It may cause severe liver or brain damage.    Your appetite may be poor, so a light diet is fine. Avoid dehydration by drinking 6 to 8 glasses of fluids per day (such as water, soft drinks, sports drinks, juices, tea, or soup). Extra fluids will help loosen secretions in the nose and lungs.    Over-the-counter cough, cold, and  sore-throat medicines will not shorten the length of the illness, but they may be helpful to reduce symptoms. (Note: Do not use decongestants if you have high blood pressure.)    If you were given an inhaler, use it exactly as directed. If you need to use it more often than prescribed, your condition may be worsening. If this happens, contact your healthcare provider.    If prescribed, finish all antibiotic medicine, even if you are feeling better after only a few days.  Follow-up care  Follow up with your healthcare provider, or as advised. If you had an X-ray or ECG (electrocardiogram), a specialist will review it. You will be notified of any new findings that may affect your care.  If you are age 65 or older, or if you have a chronic lung disease or condition that affects your immune system, or you smoke, ask your healthcare provider about getting a pneumococcal vaccine and a yearly flu shot (influenza vaccine).  When to seek medical advice  Call your healthcare provider right away if any of these occur:    Fever of 100.4 F (38 C) or higher, or as directed by your healthcare provider    Coughing up increasing amounts of colored sputum    Weakness, drowsiness, headache, facial pain, ear pain, or a stiff neck  Call 911  Call 911 if any of these occur.    Coughing up blood    Worsening weakness, drowsiness, headache, or stiff neck    Increased wheezing not helped with medication, shortness of breath, or pain with breathing  Date Last Reviewed: 9/13/2015 2000-2017 [employer]. [address] [place] [address]. All rights reserved. This information is not intended as a substitute for professional medical care. Always follow your healthcare professional's instructions.           Patient Education     When You Have Pneumonia  You have been diagnosed with pneumonia. This is a serious lung infection. Most cases of pneumonia are caused by bacteria. Pneumonia most often occurs in older adults,  young children, and people with chronic health problems.  Home care    Take your medicine exactly as directed. Dont skip doses. Continue taking your antibiotics as until they are all gone, even if you start to feel better. This will prevent the pneumonia from coming back.    Drink at least 8 glasses of water daily, unless directed otherwise. This helps to loosen and thin secretions so that you can cough them up.    Use a cool-mist humidifier in your bedroom. Be sure to clean the humidifier daily.    Dont use medicines to suppress your cough unless your cough is dry, painful, or interferes with your sleep. Coughing up mucus is normal. You may use an expectorant if your healthcare provider says its okay.    You can use warm compresses or a heating pad on the lowest setting to relieve chest discomfort. Use several times a day for 15-20 minutes at a time. To prevent injury to your skin, set the temperature to warm, not hot. Dont put the compress or pad directly on your skin. Make certain it has a cover or wrap it in a towel. This is to prevent skin burns.    Get plenty of rest until your fever, shortness of breath, and chest pain go away.    Plan to get a flu shot every year. The flu is a common cause of pneumonia. Getting a flu shot every year can help prevent both the flu and pneumonia.  Getting the pneumococcal vaccine  Talk with your healthcare provider about getting the pneumococcal vaccine. Pneumococcal pneumonia is caused by bacteria that spread from person to person. It can cause minor problems, such as ear infections. But it can also turn into life-threatening illnesses of the lungs (pneumonia), the covering of the brain and spinal cord (meningitis), and the blood (bacteremia).  Children under 2 years of age, adults over age 65, people with certain health conditions, and smokers are at the highest risk of pneumococcal disease. This vaccine can help prevent pneumococcal disease in both adults and children. Some  people should not have the vaccine. Make sure to ask your healthcare provider if you should have the vaccine.   Follow-up care  Make a follow-up appointment as directed by our staff.  When to call your healthcare provider  Call your healthcare provider right away if you have any of the following:    Fever of 100.4 F (38 C) or higher, or as directed by your healthcare provider    Mucus from the lungs (sputum) thats yellow, green, bloody, or smells bad    A large amount of sputum    Vomiting    Symptoms that get worse  Call 911  Call 911 right away if you have any of the following:    Chest pain    Trouble breathing    Blue lips or fingernails   Date Last Reviewed: 11/1/2016 2000-2017 [employer]. [address] [place] [address], [place] [address]. All rights reserved. This information is not intended as a substitute for professional medical care. Always follow your healthcare professional's instructions.           Patient Education     Reducing the Risk of Middle Ear Infections     Good handwashing can help your child prevent ear infections.     Most children have had at least one middle ear infection by the age of 2. Treatment may depend on whether the problem is acute or chronic. It also depends on how often it comes back and how long it lasts.  Reducing risk factors  Some behaviors or surroundings increase your monika risk of ear infection. Reducing such risk factors can be helpful at any point in treatment. The tips below may help:    If your child goes to group , he or she runs a greater risk of getting colds or flu. This may then lead to an ear infection. Help prevent these illnesses by teaching your child to wash his or her hands often.    If your child has nasal allergies, do your best to control dust, mold, mildew, and pet hair in the house. Also stop or greatly limit your monika contact with secondhand smoke.    If food allergies are a problem, identify the food that triggers the reaction.  Help your child avoid it.  Watching and waiting  Sometimes it is better to proceed with caution in the following ways:    If your child is diagnosed with an ear infection, the healthcare provider may prescribe antibiotics and will suggest a period of watchful waiting. This means not filling any prescriptions right away. Instead of antibiotics, a trial of medicines to relieve symptoms including those for pain or fever, is advised. This is along with waiting some time to see if a child improves without antibiotic therapy. Whether or not your healthcare provider prescribes immediate antibiotics or a period of watchful waiting depends on your child's age and risk factors.    During this time, your child should be watched to see if his or her symptoms are improving and to make sure new symptoms, such as fever or vomiting, don't develop. If a child doesn't improve within a few days or develops new symptoms, antibiotics will usually be started.    Date Last Reviewed: 12/1/2016 2000-2017 [employer]. [address] [place] [address]. All rights reserved. This information is not intended as a substitute for professional medical care. Always follow your healthcare professional's instructions.           Patient Education     Common Middle Ear Problems    Your middle ear may have been injured or infected recently. Over time, certain growths or bone disease can also harm the middle ear. Left untreated, middle ear problems often lead to lifelong hearing loss. There are two types of hearing loss: conductive and sensorineural. One or both kinds can occur. Injury, infection, certain growths, or bone disease can cause your symptoms. A ruptured eardrum or a long-lasting (chronic) ear infection may be painful and decrease hearing.  Symptoms    Hearing loss in one or both ears    Fluid, often smelly, draining from the ear    Pain, pressure, or discomfort in the ear    Ringing in the ear  Conductive and  sensorineural hearing loss  Sound waves may be disrupted before they reach the inner ear. If this happens, conductive hearing loss may occur. The ear canal can be blocked by wax, infection, a tumor, or a foreign object. The eardrum can be injured or infected. Abnormal bone growth, infection, or tumors in the middle ear can block sound waves.  Sound waves may not be processed correctly in the inner ear. If this happens, sensorineural hearing loss may occur. Permanent hearing loss is most commonly associated with sensorineural problems.  The tests and evaluations used to diagnose what type of hearing problem you have will depend on your symptoms.   Date Last Reviewed: 10/1/2016    7638-3469 [employer]. [address] [place] [address]. All rights reserved. This information is not intended as a substitute for professional medical care. Always follow your healthcare professional's instructions.           Patient Education     Step-by-Step  Using an Inhaler (in Mouth) Without a Spacer    Date Last Reviewed: 2/1/2017 2000-2017 [employer]. [address] [place] [address]. All rights reserved. This information is not intended as a substitute for professional medical care. Always follow your healthcare professional's instructions.           Patient Education     Using an Inhaler with a Spacer  To control asthma, you need to use your medicines the right way. Some medicines are inhaled using a device called a metered-dose inhaler (MDI). Metered-dose inhalers deliver medicine with a fine spray. You may be asked to use a spacer (holding tube) with your inhaler. The spacer helps make sure all the medicine you need goes into your lungs.   Steps for using an inhaler with a spacer  Step 1:    Remove the caps from the inhaler and spacer.    Shake the inhaler well and attach the spacer. If the inhaler is being used for the first time or has not been used for a while, prime it as  directed by the product maker.  Step 2:    Breathe out normally.    Put the spacer between your teeth. Close your lips tightly around it.    Keep your chin up.  Step 3:    Spray 1 puff into the spacer by pressing down on the inhaler.    Then breathe in through your mouth as slowly and deeply as you can. This should take about 5-10 seconds. If you breathe too quickly, you may hear a whistling sound in certain spacers.  Step 4:    Take the spacer out of your mouth.    Hold your breath for a count of 10.    Then hold your lips together and slowly breathe out through your mouth.          If youre prescribed more than 1 puff of medicine at a time, wait at least 30 seconds between puffs. This number may be different for different medicines. Shake the inhaler again. Then repeat steps 2 to 4.   Date Last Reviewed: 10/1/2016    8417-5383 [employer]. [address] [place] [address], [place] [address]. All rights reserved. This information is not intended as a substitute for professional medical care. Always follow your healthcare professional's instructions.

## 2021-06-17 NOTE — PATIENT INSTRUCTIONS - HE
Patient Instructions by Pelon Jackman DO at 3/25/2019  3:20 PM     Author: Pelon Jackman DO Service: -- Author Type: Physician    Filed: 3/25/2019  3:44 PM Encounter Date: 3/25/2019 Status: Signed    : Pelon Jackman DO (Physician)       Please bring one tab of low dose melatonin 3 mg or less to the night of the study.    If the patient wishes to take the melatonin. It is completely voluntary.    Patient may take own melatonin after arrival to sleep center. Do not drive or operate machinery after intake of melatonin.       Patient education: What is a sleep study?     What is a sleep study? -- A sleep study is a test that measures how well you sleep and checks for sleep problems. For some sleep studies, you stay overnight in a sleep lab at a hospital or sleep center.     What happens during a sleep study? -- Before you go to sleep, a technician attaches small, sticky patches called electrodes to your head, chest, and legs. He or she will also place a small tube beneath your nose and might wrap 1 or 2 belts around your chest.   Each of these items has wires that connect to monitors. The monitors record your movement, brain activity, breathing, and other body functions while you sleep.  If you have a history of trouble falling asleep, your doctor might prescribe a medicine to help you fall asleep in the lab. If you have never taken the medicine before, your doctor might ask you take it on a night before your sleep study to see how it affects you.   Why might my doctor order a sleep study? -- Your doctor will order a sleep study if he or she thinks you have sleep apnea or a different condition that makes you:   ?Have sudden jerking leg movements while you sleep, called periodic limb movements.   ?Feel very sleepy during the day and fall asleep all of a sudden, called narcolepsy.   ?Have trouble falling asleep or staying asleep over a long period of time, called chronic insomnia.   ?Do odd things while  you sleep, such as walking.  How should I prepare for a sleep study? -- On the day of your sleep study, you should:   ?Avoid alcohol   ?Avoid drinking coffee, tea, sodas, and other drinks that have caffeine in the afternoon and evening   ?Take all of your regular medicines    The cost of care estimate line is [phone]. They are able to give the patient an estimate of the charges and also an estimate of their insurance coverage/patient responsibility.

## 2021-06-18 NOTE — LETTER
Letter by Tomy Thompson DO at      Author: Tomy Thompson DO Service: -- Author Type: --    Filed:  Encounter Date: 1/20/2019 Status: (Other)       January 20, 2019     Patient: Placido Broderick   YOB: 1980   Date of Visit: 1/20/2019       To Whom It May Concern:    It is my medical opinion that Placido Broderick should be excused from work yesterday, 01/19/2019 due to illness.    If you have any questions or concerns, please don't hesitate to call.    Sincerely,        Electronically signed by Tomy Thompson DO

## 2021-06-18 NOTE — LETTER
Letter by Beverley Garner MD at      Author: Beverley Garner MD Service: -- Author Type: --    Filed:  Encounter Date: 2/28/2019 Status: (Other)       February 28, 2019     Patient: Placido Broderick   YOB: 1980   Date of Visit: 2/28/2019       To Whom It May Concern:    Placido Broderick was hospitalized at [facility] on February 27/28 and It is my medical opinion he may return to work on 3/4/2019.    If you have any questions or concerns, please don't hesitate to call.    Sincerely,        Electronically signed by Beverley Garner MD   Hospital Medicine Service   [phone]   Pager [phone]   [email or web address]

## 2021-06-18 NOTE — LETTER
Letter by Desmond Plasencia MD at      Author: Desmond Plasencia MD Service: -- Author Type: --    Filed:  Encounter Date: 1/15/2019 Status: (Other)       January 15, 2019     Patient: Placido Broderick   YOB: 1980   Date of Visit: 1/15/2019       To Whom It May Concern:    It is my medical opinion that Placido Broderick may return to work on 1/17/19. Patient is to NOT carry, lift, or push more than 10 lbs with the left arm starting today through 1/22/19.    If you have any questions or concerns, please don't hesitate to call.    Sincerely,        Electronically signed by Desmond Plasencia MD

## 2021-06-18 NOTE — PROGRESS NOTES
ASSESSMENT & PLAN      Placido was seen today for depression.    Diagnoses and all orders for this visit:    Obstructive sleep apnea syndrome  -     Ambulatory referral to Sleep Medicine  -     Testosterone, Total and Bioavailable  -     Prolactin  -     Thyroid Stimulating Hormone (TSH)  -     Comprehensive Metabolic Panel    Adjustment reaction of adolescence with depressed mood  -     Testosterone, Total and Bioavailable  -     Prolactin  -     Thyroid Stimulating Hormone (TSH)  -     Comprehensive Metabolic Panel    Prehypertension  -     Comprehensive Metabolic Panel  -     Urinalysis Macro & Micro  -     Microalbumin, Random Urine    Class 3 obesity in adult  -     Glycosylated Hemoglobin A1c; Future    Other orders  -     valsartan-hydrochlorothiazide (DIOVAN HCT) 160-25 mg per tablet; Take 1 tablet by mouth daily.  -     buPROPion (WELLBUTRIN SR) 150 MG 12 hr tablet; 1 DAILY FOR 3 DAYS THEN 1 TWICE DAILY        Return in about 3 weeks (around 6/8/2018).           CHIEF COMPLAINT: Placido Broderick had concerns including Depression.    [place]: 1.............. had concerns including Depression.    1. Obstructive sleep apnea syndrome    2. Adjustment reaction of adolescence with depressed mood    3. Prehypertension    4. Class 3 obesity in adult      No problem-specific Assessment & Plan notes found for this encounter.      CC:              Cousin suicided  Feeling guilty  Has missed work for 3 days  At home his wife tells him that he has apneic episodes as well as he is restless with sleep  Wakes up 2-3 times              SUBJECTIVE:  Placido Broderick is a 38 y.o. male    Past Medical History:   Diagnosis Date     Ankylosis Of The Right Foot     Created by Conversion      Helicobacter Pylori (H. Pylori) Infection     Created by Conversion      Morbid obesity     Created by Conversion      Past Surgical History:   Procedure Laterality Date     INGUINAL HERNIA REPAIR Right      Hydrocodone-acetaminophen  Current Outpatient  "Prescriptions   Medication Sig Dispense Refill     buPROPion (WELLBUTRIN SR) 150 MG 12 hr tablet 1 DAILY FOR 3 DAYS THEN 1 TWICE DAILY 60 tablet 5     ibuprofen (ADVIL,MOTRIN) 200 MG tablet Take 800 mg by mouth every 6 (six) hours as needed for pain.       valsartan-hydrochlorothiazide (DIOVAN HCT) 160-25 mg per tablet Take 1 tablet by mouth daily. 30 tablet 5     No current facility-administered medications for this visit.      No family history on file.  Social History     Social History     Marital status:      Spouse name: N/A     Number of children: N/A     Years of education: N/A     Social History Main Topics     Smoking status: Smoker, Current Status Unknown     Last attempt to quit: 1/1/2015     Smokeless tobacco: Never Used     Alcohol use 8.4 oz/week     14 Cans of beer per week     Drug use: No     Sexual activity: Yes     Partners: Female     Other Topics Concern     Not on file     Social History Narrative     Patient Active Problem List   Diagnosis     Class 3 obesity in adult     Nicotine Dependence     Prehypertension     Helicobacter Pylori (H. Pylori) Infection     Ankylosis Of The Right Foot     Plantar Fasciitis     Morbid Obesity     Pes Planus     Obstructive sleep apnea syndrome     Vitamin D deficiency                                              SOCIAL: He  reports that he has been smoking.  He has never used smokeless tobacco. He reports that he drinks about 8.4 oz of alcohol per week  He reports that he does not use illicit drugs.    REVIEW OF SYSTEMS:   Family history not pertinent to chief complaint or presenting problem    Review of systems otherwise negative as requested from patient, except   Those positive ROS outlined and discussed in [place].    OBJECTIVE:  BP (!) 138/102 (Patient Site: Left Arm, Patient Position: Sitting, Cuff Size: Adult Large)  Ht 5' 3\" (1.6 m)  Wt (!) 267 lb (121.1 kg)  BMI 47.3 kg/m2    GENERAL:     No acute distress.   Alert and oriented X 3   "       Physical:    Depressed affect  Carotid pulses are full  No cervical or subclavicular nodes  Oropharynx is clear  Lungs clear  Cardiac S1-S2 regular sinus no appreciable murmur gallop  Trace edema lower extremities        ASSESSMENT & PLAN      Placido was seen today for depression.    Diagnoses and all orders for this visit:    Obstructive sleep apnea syndrome  -     Ambulatory referral to Sleep Medicine  -     Testosterone, Total and Bioavailable  -     Prolactin  -     Thyroid Stimulating Hormone (TSH)  -     Comprehensive Metabolic Panel    Adjustment reaction of adolescence with depressed mood  -     Testosterone, Total and Bioavailable  -     Prolactin  -     Thyroid Stimulating Hormone (TSH)  -     Comprehensive Metabolic Panel    Prehypertension  -     Comprehensive Metabolic Panel  -     Urinalysis Macro & Micro  -     Microalbumin, Random Urine    Class 3 obesity in adult  -     Glycosylated Hemoglobin A1c; Future    Other orders  -     valsartan-hydrochlorothiazide (DIOVAN HCT) 160-25 mg per tablet; Take 1 tablet by mouth daily.  -     buPROPion (WELLBUTRIN SR) 150 MG 12 hr tablet; 1 DAILY FOR 3 DAYS THEN 1 TWICE DAILY        Return in about 3 weeks (around 6/8/2018).       Anticipatory Guidance and Symptomatic Cares Discussed   Advised to call back directly if there are further questions, or if these symptoms fail to improve as anticipated or worsen.  Return to clinic if patient has a clinical concern that warrants an exam.        20  Min Total Time, > 50% counseling and coordination of Care    Harry Bess MD  Family Medicine   [facility] [address] [phone]

## 2021-06-19 NOTE — LETTER
Letter by Aaron Davila MD at      Author: Aaron Davila MD Service: -- Author Type: --    Filed:  Encounter Date: 8/13/2019 Status: (Other)         Harry Bess MD  [address] [facility] [address]                                  August 13, 2019    Patient: Placido Broderick   MR Number: [id number]   YOB: 1980   Date of Visit: 8/13/2019     Dear Dr. Shahriar MD:    Thank you for referring Placido Broderick to me for evaluation. Below are the relevant portions of my assessment and plan of care.    If you have questions, please do not hesitate to call me. I look forward to following Placido along with you.    Sincerely,        Aaron Davila MD          CC  No Recipients  Aaron Davila MD  8/13/2019  9:46 AM  Sign at close encounter  Assessment and Plan:Placido Broderick is a 39 y.o. M with a past medical history significant for obesity and asthma who presents to clinic today for evaluation of his cough and dyspnea.  He has severe asthma based on his PFTs with an FEV1 of 41% and a NiOX of 75.  He has no emphysema on a recent CT to suggest this is COPD, and his symptoms are more consistent with asthma with sensitivities to allergies and dust and daily vacilations of symptoms.  He needs significantly more medical therapy and closer monitoring by our office.  He is at high risk of morbidity from this process, and recently had a hospitalization from wheezing.  I suspect he suppressed his symptoms with smoking, however this is not the best long term solution to asthma control.     1. Asthma control - He has symbicort, but is not using it.  This should be used 2 puffs twice a day regardless of how he feels.  Will also add singulair and an OTC antihistamine to his regimen.  Will recheck his NiOX on his next visit to gauge the effectiveness of his ICS to see if he needs more.  2. Asthma action when ill - he has a nebulizer with duonebs for as needed use.  In time he may not need this everyday.  He  should carry his albuterol MDI everywhere he goes.  I have also filled out an asthma action plan and refilled his medrol dose pack for use if his controlling medications are not working  3. Technique- I supplied a new spacer and reeducated him on its use and how it works.  I also encouraged him to rinse his mouth when done with the inhaled medicines, particularly the symbicort.  4. RTC in 2 months      CCx: cough    HPI: Mr. Broderick is a 39 year old male with a history of presumed asthma who presents for evaluation.  He thinks his breathing problems have gotten worse since he quit smoking about 6 months ago.  He notes he gets short of breath with wheezing and coughing episodically.  He was suspected of having asthma and was given symbicort by his primary doctor which he thinks helped, but he stopped using it because he didn't think he needed it.  Now he uses therapy based on how he feels.  Every morning he feels tight so uses his duoneb.  If he still feels bad, he will take a single solumedrol from a dose chago he didn't complete.  He is worried because he is out of that medication now.  He has an albuterol MDI, but isn't sure it helps a lot.  He had a spacer, but also wasn't sure it was working so he may have lost it.    He describes frequent nasal stuffiness, but doesn't like nasal sprays.  He takes claritin as needed but isn't on it now.  He does feel his breathing is worse when his allergies are active and also after working all day in the [employer].  He wears a mask at work with some benefit.    He was hospitalized with his asthma in February for a night.  He was given prednisone which he says helps every time.      PMH:  Past Medical History:   Diagnosis Date   ? Ankylosis Of The Right Foot     Created by Conversion    ? Helicobacter Pylori (H. Pylori) Infection     Created by Conversion    ? Morbid obesity (H)     Created by Conversion    asthma    PSH:  Past Surgical History:   Procedure Laterality Date   ?  INGUINAL HERNIA REPAIR Right        SH:  Social History     Socioeconomic History   ? Marital status:      Spouse name: Not on file   ? Number of children: Not on file   ? Years of education: Not on file   ? Highest education level: Not on file   Occupational History   ? Not on file   Social Needs   ? Financial resource strain: Not on file   ? Food insecurity:     Worry: Not on file     Inability: Not on file   ? Transportation needs:     Medical: Not on file     Non-medical: Not on file   Tobacco Use   ? Smoking status: Former Smoker     Last attempt to quit: 2018     Years since quittin.6   ? Smokeless tobacco: Never Used   Substance and Sexual Activity   ? Alcohol use: Yes     Alcohol/week: 8.4 oz     Types: 14 Cans of beer per week   ? Drug use: No   ? Sexual activity: Yes     Partners: Female   Lifestyle   ? Physical activity:     Days per week: Not on file     Minutes per session: Not on file   ? Stress: Not on file   Relationships   ? Social connections:     Talks on phone: Not on file     Gets together: Not on file     Attends Gnosticist service: Not on file     Active member of club or organization: Not on file     Attends meetings of clubs or organizations: Not on file     Relationship status: Not on file   ? Intimate partner violence:     Fear of current or ex partner: Not on file     Emotionally abused: Not on file     Physically abused: Not on file     Forced sexual activity: Not on file   Other Topics Concern   ? Not on file   Social History Narrative   ? Not on file       Family history:  Family History   Problem Relation Age of Onset   ? Snoring Brother      The family history was reviewed and is not pertinent to the chief complaint or HPI.    ROS:  Review of Systems - History obtained from the patient  General ROS: negative  Psychological ROS: negative  ENT ROS: negative  Allergy and Immunology ROS: negative  Endocrine ROS: negative  Respiratory ROS: positive for - cough, pleuritic  pain, shortness of breath, sputum changes and wheezing  negative for - hemoptysis or orthopnea  Cardiovascular ROS: no chest pain or palpitations  Gastrointestinal ROS: no abdominal pain, change in bowel habits, or black or bloody stools  Genito-Urinary ROS: no dysuria, trouble voiding, or hematuria  Musculoskeletal ROS: negative  Neurological ROS: no TIA or stroke symptoms  Dermatological ROS: negative      Current Meds:  Current Outpatient Medications   Medication Sig   ? albuterol (PROAIR HFA;PROVENTIL HFA;VENTOLIN HFA) 90 mcg/actuation inhaler Inhale 2 puffs every 4 (four) hours as needed for wheezing or shortness of breath.   ? amLODIPine (NORVASC) 5 MG tablet Take 1 tablet (5 mg total) by mouth daily.   ? budesonide-formoterol (SYMBICORT) 160-4.5 mcg/actuation inhaler Inhale 2 puffs 2 (two) times a day.   ? ipratropium-albuterol (DUO-NEB) 0.5-2.5 mg/3 mL nebulizer USE 3 ML VIA NEBULIZER FOUR TIMES DAILY   ? methylPREDNISolone (MEDROL DOSEPACK) 4 mg tablet follow package directions   ? oxyCODONE (ROXICODONE) 5 MG immediate release tablet 1 TO 2 TABLETS EVERY 8 HOURS BREAKTHROUGH RIB PAIN   ? triamterene-hydrochlorothiazide (DYAZIDE) 37.5-25 mg per capsule Take 1 capsule by mouth daily.   ? valsartan-hydrochlorothiazide (DIOVAN HCT) 160-25 mg per tablet Take 1 tablet by mouth daily.   ? montelukast (SINGULAIR) 10 mg tablet Take 1 tablet (10 mg total) by mouth at bedtime.       Labs:  Recent Results (from the past 72 hour(s))   POCT hemoglobin   Result Value Ref Range    Hgb 15.3 7.0 g/dL       I have personally reviewed all imaging and PFT data available pertinent to this visit.    Imaging studies:  Xr Chest 2 Views    Result Date: 2/27/2019  XR CHEST 2 VIEWS 2/27/2019 10:48 AM INDICATION: Sob COMPARISON: 01/11/2019 FINDINGS: Minimal change. There is very slight central interstitial prominence suggesting minimal bronchial wall thickening. No focal pneumonia or pleural effusion. Heart size normal.    Ct Chest  Without Contrast    Result Date: 2/27/2019  [facility] RADIOLOGY EXAM: CT CHEST WO CONTRAST LOCATION: [facility] DATE/TIME: 2/27/2019 6:01 PM INDICATION: Shortness of breath persistent wheezing, reactive airway disease, wondering about possiblity of parenchymal lung disease COMPARISON: Chest x-ray the same date TECHNIQUE: Helical images were obtained through the chest. Multiplanar reformats were obtained. Dose reduction techniques were used. IV CONTRAST: None. FINDINGS: LUNGS AND PLEURA: The central airways are clear. No focal consolidation or pleural effusion. No pulmonary mass or suspicious nodule. MEDIASTINUM: Prominent bilateral axillary as well as mediastinal lymph nodes which are likely reactive. A dominant anterior paratracheal node measures 8 mm short axis dimension. Normal heart size. Trace pericardial fluid. LIMITED UPPER ABDOMEN: Negative. MUSCULOSKELETAL: Multiple old healed right-sided rib fractures. There are also healing displaced fractures of the left 10th and 11th ribs.     CONCLUSION: 1.  No acute pulmonary parenchymal findings. No focal pneumonic infiltrate or pleural effusion. 2.  Healing displaced fractures of the left 10th and 11th ribs. Old healed right-sided rib fractures are also noted.       PFTs:  FEV1/FVC is 55% and is reduced.  FEV1 is 1.33L (41%) predicted and is reduced.  FVC is 2.40L (63%) predicted and reduced.  There was improvement in spirometry after a single inhaled dose of bronchodilator.  TLC is 6.12L (103%) predicted and is normal.  RV is 3.27L (187%) predicted and is increased.  DLCO is 23.17ml/min/hg (75%) predicted and is reduced when it is corrected for hemoglobin.  Flow volume loops indicate obstruction.    Impression:  Full Pulmonary Function Test is abnormal.  PFTs are consistent with severe obstructive disease.  Spirometry is consistent with reversibility.  There is no hyperinflation.  There is air-trapping.  Diffusion capacity when corrected for hemoglobin is  "mildly reduced.    Aaron Davila  [facility]          Physical Exam:  /62   Pulse 96   Resp 12   Ht 5' 4\" (1.626 m)   Wt (!) 263 lb (119.3 kg)   SpO2 95%   BMI 45.14 kg/m     General - Well nourished, obese  Ears/Mouth - OP pink moist, no thrush  Neck - no cervical lymphadenopathy  Lungs - Clear to ausculation bilaterally   CVS - regular rhythm with no murmurs, rubs or gallups  Abdomen - soft, NT, ND, NABS  Ext - no cyanosis, clubbing or edema  Skin - no rash  Psychology - alert and oriented, answers appropriate        Electronically signed by:    Aaron Davila MD PhD  [facility]       "

## 2021-06-19 NOTE — LETTER
Letter by Harry Bess MD at      Author: Harry Bess MD Service: -- Author Type: --    Filed:  Encounter Date: 5/15/2019 Status: (Other)         Placido Broderick  [address]  [place] [address]             May 15, 2019         Dear Mr. Broderick,    Below are the results from your recent visit:    Resulted Orders   Immunofixation Electrophoresis, Serum   Result Value Ref Range    Immunofixation Electrophoresis, Serum Unremarkable immunofixation electrophoresis.      Path ICD: R68.89     Interpreted By: Hany Sales MD    Immunofixation Electrophoresis, Urine   Result Value Ref Range    Immunofixation Electropheresis, Urine Unremarkable immunofixation electrophoresis.     Path ICD: S22.42XD     Interpreted By: Gutierrez Serrano MD        Good to see!  NO evidence of abnormal Proteins!  DanL    Please call with questions or contact us using [employer].    Sincerely,        Electronically signed by Harry Bess MD

## 2021-06-19 NOTE — LETTER
Letter by Aaron Davila MD at      Author: Aaron Davila MD Service: -- Author Type: --    Filed:  Encounter Date: 7/25/2019 Status: (Other)         Placido ROCHE  [place] [address]    July 25, 2019    Dear Mr. Broderick,    Welcome to [facility]! Your appointment information is below.   Please bring the following to your appointment:    Insurance Card, so we may scan it for our records    Drivers license or valid ID, so we may scan it for our records    Co-pay (as applicable per your insurance plan)    A current list of your medications including over the counter products such as vitamins and supplements    Your medical records including copies of X-Ray films if you are transferring your care from another clinic.  If you do not have your records, please fill out the release of information form and we will request those records.     Provider: Aaron Davila MD  Appointment Date:   Tuesday, August 13, 2019  Arrival Time:  8:00  PFT, 9:00 dr appt.    Location: [address]        [facility], [address]    **Please allow adequate time for your commute and parking. If you are more than 10 minutes late, you may be asked to reschedule.     If you need to cancel or reschedule your appointment, please notify us at least 24 hours prior to your appointment time so we are able to make this time available for another patient.    Thank you for choosing the [facility] for your health care needs. If you have any questions, please do not hesitate to contact us at any time at   [phone]. We look forward to caring for you.     Sincerely,     [facility] staff

## 2021-06-19 NOTE — LETTER
Letter by Harry Bess MD at      Author: Harry Bess MD Service: -- Author Type: --    Filed:  Encounter Date: 4/23/2019 Status: (Other)         April 23, 2019     Patient: Placido Broderick   YOB: 1980   Date of Visit: 4/23/2019       To Whom It May Concern:    It is my medical opinion that Placido Broderick should remain out of work until 4/26/2019.    His first day away was 4/19/2019.        If you have any questions or concerns, please don't hesitate to call.    Sincerely,        Electronically signed by Harry Bess MD

## 2021-06-19 NOTE — LETTER
Letter by Aaron Davila MD at      Author: Aaron Davila MD Service: -- Author Type: --    Filed:  Encounter Date: 10/7/2019 Status: Signed         Harry Bess MD  [address] [facility] [address]                                  October 7, 2019    Patient: Placido Broderick   MR Number: [id number]   YOB: 1980   Date of Visit: 10/7/2019     Dear Dr. Shahriar MD:    Thank you for referring Placido Broderick to me for evaluation. Below are the relevant portions of my assessment and plan of care.    If you have questions, please do not hesitate to call me. I look forward to following Placido along with you.    Sincerely,        Aaron Davila MD          CC  No Recipients  Aaron Davila MD  10/7/2019  5:04 PM  Sign when Signing Visit  Assessment and Plan:Placido Broderick is a 39 y.o. M with a past medical history significant for severe persistent asthma who presents to clinic today for follow up.  Unfortunately Mr. Broderick stopped his symbicort a month after I saw him and restarted smoking.  He did have another exacerbation requiring a medrol dose chago.  He has since requit smoking.  I spent time with him discussing how symbicort worked, and given the severity of his asthma, I would recommend he stay on this for the forseeable future.  He denies issue with insurance coverage.    1) Asthma exacerbation - completed medrol dose chago, refilled in case of another exacerbation in the future.  Avoid all tobacco smoke as this was likely a trigger.  2) Asthma control - needs to stay on his ICS/LABA with the symbicort.  Rarely needed albuterol while on the symbicort.  Plan to recheck his NIOX and spirometry while on these agents to prove he is getting benefit and reinforce taking it.  Remain on singulair as well.  3) RTC in 2 months to continue to reinforce adherence to his regimen           CCx: asthma    HPI: Mr. Broderick is a 39 year old male who presents for follow up of his asthma.  Since I last saw him he  states he took the symbicort for a month and then stopped it thinking he didn't need it anymore.  He also started smoking again and then developed an exacerbation a couple of weeks ago.  He took a medrol dose chago and stopped smoking and feels more or less back to normal today.  He is not using his symbicort still, and hasn't used his albuterol in a while.  He does take singulair everynight.  He has no issues paying for the symbicort.      ROS:  Review of Systems - History obtained from the patient  General ROS: negative  Psychological ROS: negative  ENT ROS: negative  Allergy and Immunology ROS: negative  Endocrine ROS: negative  Respiratory ROS: positive for - cough, shortness of breath and wheezing  negative for - hemoptysis, orthopnea or pleuritic pain  Cardiovascular ROS: no chest pain or palpitations  Gastrointestinal ROS: no abdominal pain, change in bowel habits, or black or bloody stools  Genito-Urinary ROS: no dysuria, trouble voiding, or hematuria  Musculoskeletal ROS: negative  Neurological ROS: no TIA or stroke symptoms  Dermatological ROS: negative      Current Meds:  Current Outpatient Medications   Medication Sig   ? albuterol (PROAIR HFA;PROVENTIL HFA;VENTOLIN HFA) 90 mcg/actuation inhaler Inhale 2 puffs every 4 (four) hours as needed for wheezing or shortness of breath.   ? amLODIPine (NORVASC) 5 MG tablet Take 1 tablet (5 mg total) by mouth daily.   ? budesonide-formoterol (SYMBICORT) 160-4.5 mcg/actuation inhaler Inhale 2 puffs 2 (two) times a day.   ? ipratropium-albuterol (DUO-NEB) 0.5-2.5 mg/3 mL nebulizer USE 3 ML VIA NEBULIZER FOUR TIMES DAILY   ? methylPREDNISolone (MEDROL DOSEPACK) 4 mg tablet follow package directions   ? montelukast (SINGULAIR) 10 mg tablet Take 1 tablet (10 mg total) by mouth at bedtime.   ? triamterene-hydrochlorothiazide (DYAZIDE) 37.5-25 mg per capsule Take 1 capsule by mouth daily.   ? methylPREDNISolone (MEDROL DOSEPACK) 4 mg tablet Follow package directions        Labs:  No results found for this or any previous visit (from the past 72 hour(s)).    I have personally reviewed all pertinent imaging studies and PFT results unless otherwise noted.    Imaging studies:  Xr Chest 2 Views    Result Date: 2/27/2019  XR CHEST 2 VIEWS 2/27/2019 10:48 AM INDICATION: Sob COMPARISON: 01/11/2019 FINDINGS: Minimal change. There is very slight central interstitial prominence suggesting minimal bronchial wall thickening. No focal pneumonia or pleural effusion. Heart size normal.    Ct Chest Without Contrast    Result Date: 2/27/2019  [facility] RADIOLOGY EXAM: CT CHEST WO CONTRAST LOCATION: [facility] DATE/TIME: 2/27/2019 6:01 PM INDICATION: Shortness of breath persistent wheezing, reactive airway disease, wondering about possiblity of parenchymal lung disease COMPARISON: Chest x-ray the same date TECHNIQUE: Helical images were obtained through the chest. Multiplanar reformats were obtained. Dose reduction techniques were used. IV CONTRAST: None. FINDINGS: LUNGS AND PLEURA: The central airways are clear. No focal consolidation or pleural effusion. No pulmonary mass or suspicious nodule. MEDIASTINUM: Prominent bilateral axillary as well as mediastinal lymph nodes which are likely reactive. A dominant anterior paratracheal node measures 8 mm short axis dimension. Normal heart size. Trace pericardial fluid. LIMITED UPPER ABDOMEN: Negative. MUSCULOSKELETAL: Multiple old healed right-sided rib fractures. There are also healing displaced fractures of the left 10th and 11th ribs.     CONCLUSION: 1.  No acute pulmonary parenchymal findings. No focal pneumonic infiltrate or pleural effusion. 2.  Healing displaced fractures of the left 10th and 11th ribs. Old healed right-sided rib fractures are also noted.         Physical Exam:  /88   Pulse (!) 104   Resp 24   Wt (!) 264 lb 1.6 oz (119.8 kg)   SpO2 96% Comment: RA  BMI 45.33 kg/m     General - Well nourished  Ears/Mouth -  OP  pink moist, no thrush  Neck - no cervical lymphadenopathy  Lungs - Clear to ausculation bilaterally   CVS - regular rhythm with no murmurs, rubs or gallups  Abdomen - soft, NT, ND, NABS  Ext - no cyanosis, clubbing or edema  Skin - no rash  Psychology - alert and oriented, answers appropriate        Electronically signed by:    Aaron Davila MD PhD  [facility]

## 2021-06-19 NOTE — LETTER
Letter by Harry Bess MD at      Author: Harry Bess MD Service: -- Author Type: --    Filed:  Encounter Date: 3/26/2019 Status: (Other)         March 26, 2019     Patient: Placido Broderick   YOB: 1980   Date of Visit: 3/26/2019       To Whom it May Concern:    Placido Broderick was seen in my clinic on 3/26/2019.He has sustained a rib fracture and should do no more than 30 lbs push/pull and life until 4/13/2019.  Unless otherwise instructed may return to no limitations on 4/14/2019.    If you have any questions or concerns, please don't hesitate to call.    Sincerely,         Electronically signed by Harry Bess MD

## 2021-06-20 NOTE — LETTER
Letter by Harry Bess MD at      Author: Harry Bess MD Service: -- Author Type: --    Filed:  Encounter Date: 7/10/2020 Status: (Other)                    My Depression Action Plan  Name: Placido Broderick   Date of Birth 1980  Date: 7/10/2020    My Doctor: Harry Bess MD   My Clinic: [facility]   [address] [place] [id number]  [phone]          GREEN    ZONE   Good Control    What it looks like:     Things are going generally well. You have normal ups and downs. You may even feel depressed from time to time, but bad moods usually last less than a day.   What you need to do:  1. Continue to care for yourself (see self care plan)  2. Check your depression survival kit and update it as needed  3. Follow your physicians recommendations including any medication.  4. Do not stop taking medication unless you consult with your physician first.           YELLOW         ZONE Getting Worse    What it looks like:     Depression is starting to interfere with your life.     It may be hard to get out of bed; you may be starting to isolate yourself from others.    Symptoms of depression are starting to last most all day and this has happened for several days.     You may have suicidal thoughts but they are not constant.   What you need to do:     1. Call your care team. Your response to treatment will improve if you keep your care team informed of your progress. Yellow periods are signs an adjustment may need to be made.     2. Continue your self-care.  Just get dressed and ready for the day.  Don't give yourself time to talk yourself out of it.    3. Talk to someone in your support network.    4. Open up your depression Depression Self-Care Plan / Wellness kit.           RED    ZONE Medical Alert - Get Help    What it looks like:     Depression is seriously interfering with your life.     You may experience these or other symptoms: You cant get out of bed most days, cant work or  engage in other necessary activities, you have trouble taking care of basic hygiene, or basic responsibilities, thoughts of suicide or death that will not go away, self-injurious behavior.     What you need to do:  1. Call your care team and request a same-day appointment. If they are not available (weekends or after hours) call your local crisis line, emergency room or 911.            Self-Care Plan / Wellness Kit    Self-Care for Depression  Heres the deal. Your body and mind are really not as separate as most people think.  What you do and think affects how you feel and how you feel influences what you do and think. This means if you do things that people who feel good do, it will help you feel better.  Sometimes this is all it takes.  There is also a place for medication and therapy depending on how severe your depression is, so be sure to consult with your medical provider and/ or Behavioral Health Consultant if your symptoms are worsening or not improving.     In order to better manage my stress, I will:    Exercise  Get some form of exercise, every day. This will help reduce pain and release endorphins, the feel good chemicals in your brain. This is almost as good as taking antidepressants!  This is not the same as joining a gym and then never going! (they count on that by the way?) It can be as simple as just going for a walk or doing some gardening, anything that will get you moving.      Hygiene   Maintain good hygiene (get out of bed in the morning, make your bed, brush your teeth, take a shower, and get dressed like you were going to work, even if you are unemployed).  If your clothes don't fit try to get ones that do.    Diet  Strive to eat foods that are good for me, drink plenty of water, and avoid excessive sugar, caffeine, alcohol, and other mood-altering substances.  Some foods that are helpful in depression are: complex carbohydrates, B vitamins, flaxseed, fish or fish oil, fresh fruits and  vegetables.    Psychotherapy  Agree to participate in Individual Therapy (if recommended).    Medication  If prescribed medications, I agree to take them.  Missing doses can result in serious side effects.  I understand that drinking alcohol, or other illicit drug use, may cause potential side effects.  I will not stop my medication abruptly without first discussing it with my provider.    Staying Connected With Others  Stay in touch with my friends, family members, and my primary care provider/team.    Use your imagination  Be creative.  We all have a creative side; it doesnt matter if its oil painting, sand castles, or mud pies! This will also kick up the endorphins.    Witness Beauty  (AKA stop and smell the roses) Take a look outside, even in mid-winter. Notice colors, textures. Watch the squirrels and birds.     Service to others  Be of service to others.  There is always someone else in need.  By helping others we can get out of ourselves and remember the really important things.  This also provides opportunities for practicing all the other parts of the program.    Humor  Laugh and be silly!  Adjust your TV habits for less news and crime-drama and more comedy.    Control your stress  Try breathing deep, massage therapy, biofeedback, and meditation. Find time to relax each day.     Crisis Text Line  http://www.crisistextline.org    The Crisis Text Line serves anyone, in any type of crisis, providing access to free, 24/7 support and information via the medium people already use and trust:    Here's how it works:  1.  Text 944-754 from anywhere in the [place], anytime, about any type of crisis.  2.  A live, trained Crisis Counselor receives the text and responds quickly.  3.  The volunteer Crisis Counselor will help you move from a 'hot moment to a cool moment'.  My support system    Clinic Contact:  Phone number:    Contact 1:  Phone number:    Contact 2:  Phone number:    Worship/:  Phone  number:    Therapist:  Phone number:    [facility] crisis center:    Phone number:    Other community support:  Phone number:

## 2021-06-20 NOTE — LETTER
Letter by Harry Bess MD at      Author: Harry Bess MD Service: -- Author Type: --    Filed:  Encounter Date: 7/10/2020 Status: (Other)       My Asthma Action Plan    Name: Placido Broderick   YOB: 1980  Date: 7/10/2020   My doctor: Harry Bess MD   My clinic: [facility]         My Control Medicine: Budesonide + formoterol (Symbicort HFA) -  160/4.5 mcg 2 bid (twice daily)     Singulair 10 mg Nightly   My Rescue Medicine: Albuterol (Proair/Ventolin/Proventil HFA) 2-4 puffs EVERY 4 HOURS as needed. Use a spacer if recommended by your provider.   My Oral Steroid Medicine: prednisone My Asthma Severity:   Moderate Persistent  Know your asthma triggers: upper respiratory infections, pollens and humidity               GREEN ZONE   Good Control    I feel good    No cough or wheeze    Can work, sleep and play without asthma symptoms     Take your asthma control medicine every day.     1. If exercise triggers your asthma, take your rescue medication    15 minutes before exercise or sports, and    During exercise if you have asthma symptoms  2. Spacer to use with inhaler: If you have a spacer, make sure to use it with your inhaler             YELLOW ZONE Getting Worse  I have ANY of these:    I do not feel good    Cough or wheeze    Chest feels tight    Wake up at night 1. Keep taking your Green Zone medications  2. Start taking your rescue medicine:    every 20 minutes for up to 1 hour. Then every 4 hours for 24-48 hours.  3. If you stay in the Yellow Zone for more than 12-24 hours, contact your doctor.  4. If you do not return to the Green Zone in 12-24 hours or you get worse, start taking your oral steroid medicine if prescribed by your provider.           RED ZONE Medical Alert - Get Help  I have ANY of these:    I feel awful    Medicine is not helping    Breathing getting harder    Trouble walking or talking    Nose opens wide to breathe     1. Take your rescue medicine  NOW  2. If your provider has prescribed an oral steroid medicine, start taking it NOW  3. Call your doctor NOW  4. If you are still in the Red Zone after 20 minutes and you have not reached your doctor:    Take your rescue medicine again and    Call 911 or go to the emergency room right away    See your regular doctor within 2 weeks of an Emergency Room or Urgent Care visit for follow-up treatment.          Annual Reminders:  Meet with Asthma Educator,  Flu Shot in the Fall, consider Pneumonia Vaccination for patients with asthma (aged 19 and older).    Pharmacy:   [id number] - [place], [place] - [address]  [address] [place] [facility] [id number]  Phone: [phone] Fax: [phone]      Electronically signed by Harry Bess MD   Date: 07/10/20                    Asthma Triggers  How To Control Things That Make Your Asthma Worse    Triggers are things that make your asthma worse.  Look at the list below to help you find your triggers and what you can do about them.  You can help prevent asthma flare-ups by staying away from your triggers.      Trigger                                                          What you can do   Cigarette Smoke  Tobacco smoke can make asthma worse. Do not allow smoking in your home, car or around you.  Be sure no one smokes at a [employer]ika day care or school.  If you smoke, ask your health care provider for ways to help you quit.  Ask family members to quit too.  Ask your health care provider for a referral to Quit Plan to help you quit smoking, or call 7-617-678-PLAN.     Colds, Flu, Bronchitis  These are common triggers of asthma. Wash your hands often.  Dont touch your eyes, nose or mouth.  Get a flu shot every year.     Dust Mites  These are tiny bugs that live in cloth or carpet. They are too small to see. Wash sheets and blankets in hot water every week.   Encase pillows and mattress in dust mite proof covers.  Avoid  having carpet if you can. If you have carpet, vacuum weekly.   Use a dust mask and HEPA vacuum.   Pollen and Outdoor Mold  Some people are allergic to trees, grass, or weed pollen, or molds. Try to keep your windows closed.  Limit time out doors when pollen count is high.   Ask you health care provider about taking medicine during allergy season.     Animal Dander  Some people are allergic to skin flakes, urine or saliva from pets with fur or feathers. Keep pets with fur or feathers out of your home.    If you cant keep the pet outdoors, then keep the pet out of your bedroom.  Keep the bedroom door closed.  Keep pets off cloth furniture and away from stuffed toys.     Mice, Rats, and Cockroaches   Some people are allergic to the waste from these pests.   Cover food and garbage.  Clean up spills and food crumbs.  Store grease in the refrigerator.   Keep food out of the bedroom.   Indoor Mold  This can be a trigger if your home has high moisture. Fix leaking faucets, pipes, or other sources of water.   Clean moldy surfaces.  Dehumidify basement if it is damp and smelly.   Smoke, Strong Odors, and Sprays  These can reduce air quality. Stay away from strong odors and sprays, such as perfume, powder, hair spray, paints, smoke incense, paint, cleaning products, candles and new carpet.   Exercise or Sports  Some people with asthma have this trigger. Be active!  Ask your doctor about taking medicine before sports or exercise to prevent symptoms.    Warm up for 5-10 minutes before and after sports or exercise.     Other Triggers of Asthma  Cold air:  Cover your nose and mouth with a scarf.  Sometimes laughing or crying can be a trigger.  Some medicines and food can trigger asthma.

## 2021-06-20 NOTE — LETTER
Letter by Narda Mack LPN at      Author: Narda Mack LPN Service: -- Author Type: --    Filed:  Encounter Date: 3/4/2020 Status: (Other)         Placido Broderick  [address]  [place] [address]             March 4, 2020         Dear Mr. Broderick,     If you decide to follow through with your in lab sleep study, please call [phone] and I will help you  schedule that appointment.  Thank you for letting [facility] participate in your  care.    Please call with questions or contact us using [email or web address].    Sincerely,        Electronically signed by Narda Mack LPN

## 2021-06-20 NOTE — LETTER
Letter by Aaron Davila MD at      Author: Aaron Davila MD Service: -- Author Type: --    Filed:  Encounter Date: 1/6/2020 Status: Signed         Harry Bess MD  [address] [facility] [address]                                  January 6, 2020    Patient: Placido Broderick   MR Number: [id number]   YOB: 1980   Date of Visit: 1/6/2020     Dear Dr. Shahriar MD:    Thank you for referring Placido Broderick to me for evaluation. Below are the relevant portions of my assessment and plan of care.    If you have questions, please do not hesitate to call me. I look forward to following Placido along with you.    Sincerely,        Aaron Davila MD          CC  No Recipients  Aarno Davila MD  1/6/2020 11:07 AM  Sign when Signing Visit  Assessment and Plan:Placido Broderick is a 39 y.o. M with a past medical history significant for severe persistent asthma who presents to clinic today for follow up.  He continues to have challenges taking his symbicort routinely and quitting smoking.  He is not under great control, but I'm convinced he could do much better with adherence to his symbicort.  His NIOX is 66 today, and a full dose of regular ICS use could go a long way.      1) Severe persistent asthma maintenance - stay on Symbicort 160/4.5 2 puffs twice a day, regardless of how he feels.  Albuterol as needed and singulair every night.  2) Asthma exacerbation  - needs to use steroids as prescribed rather than one pill here or there.  Prescribed a taper for his current exacerbation, although he may be able to recover himself by simply taking his symbicort as prescribed.  He is not wheezing today.  3) Tobacco dependence - recommend he stop smoking for good.  This on and off of tobacco addiction is going to induced asthma exacerbations regardless of adherence to his medications.  4) RTC in 3 months           CCx: asthma    HPI: Mr. Broderick is a 39 year old male with severe persistent asthma who returns for  follow up.  Since I saw him last he has again restarted and stopped smoking.  He quit 5 days ago.  He also stopped using his symbicort for a week because he felt fine, but then restarted it recently when he felt worse.  He is using it once a day.  He is coughing and wheezing more than usual.  He notes his symptoms aren't persistent and they come and go.  He takes a single solumedrol pill here and there depending on his symptoms, this is out of a dose chago he was given a while back.  He states he never takes all of the pills in a dose pack as he likes to save them.    ROS:  Review of Systems - History obtained from the patient  General ROS: negative  Psychological ROS: negative  ENT ROS: negative  Allergy and Immunology ROS: negative  Endocrine ROS: negative  Respiratory ROS: positive for - cough, shortness of breath and wheezing  negative for - hemoptysis, orthopnea or pleuritic pain.  Cardiovascular ROS: no chest pain or palpitations  Gastrointestinal ROS: no abdominal pain, change in bowel habits, or black or bloody stools  Genito-Urinary ROS: no dysuria, trouble voiding, or hematuria  Musculoskeletal ROS: negative  Neurological ROS: no TIA or stroke symptoms  Dermatological ROS: negative      Current Meds:  Current Outpatient Medications   Medication Sig   ? albuterol (PROAIR HFA;PROVENTIL HFA;VENTOLIN HFA) 90 mcg/actuation inhaler Inhale 2 puffs every 4 (four) hours as needed for wheezing or shortness of breath.   ? amLODIPine (NORVASC) 5 MG tablet Take 1 tablet (5 mg total) by mouth daily.   ? budesonide-formoterol (SYMBICORT) 160-4.5 mcg/actuation inhaler Inhale 2 puffs 2 (two) times a day.   ? ipratropium-albuterol (DUO-NEB) 0.5-2.5 mg/3 mL nebulizer USE 3 ML VIA NEBULIZER FOUR TIMES DAILY   ? methylPREDNISolone (MEDROL DOSEPACK) 4 mg tablet follow package directions   ? montelukast (SINGULAIR) 10 mg tablet Take 1 tablet (10 mg total) by mouth at bedtime.   ? predniSONE (DELTASONE) 10 mg tablet Take 40 mg by  "mouth daily for 3 days, THEN 30 mg daily for 3 days, THEN 20 mg daily for 3 days, THEN 10 mg daily for 3 days.       Labs:  No results found for this or any previous visit (from the past 72 hour(s)).    I have personally reviewed all pertinent imaging studies and PFT results unless otherwise noted.    Imaging studies:  Xr Chest 2 Views    Result Date: 2/27/2019  XR CHEST 2 VIEWS 2/27/2019 10:48 AM INDICATION: Sob COMPARISON: 01/11/2019 FINDINGS: Minimal change. There is very slight central interstitial prominence suggesting minimal bronchial wall thickening. No focal pneumonia or pleural effusion. Heart size normal.    Ct Chest Without Contrast    Result Date: 2/27/2019  [facility] RADIOLOGY EXAM: CT CHEST WO CONTRAST LOCATION: [facility] DATE/TIME: 2/27/2019 6:01 PM INDICATION: Shortness of breath persistent wheezing, reactive airway disease, wondering about possiblity of parenchymal lung disease COMPARISON: Chest x-ray the same date TECHNIQUE: Helical images were obtained through the chest. Multiplanar reformats were obtained. Dose reduction techniques were used. IV CONTRAST: None. FINDINGS: LUNGS AND PLEURA: The central airways are clear. No focal consolidation or pleural effusion. No pulmonary mass or suspicious nodule. MEDIASTINUM: Prominent bilateral axillary as well as mediastinal lymph nodes which are likely reactive. A dominant anterior paratracheal node measures 8 mm short axis dimension. Normal heart size. Trace pericardial fluid. LIMITED UPPER ABDOMEN: Negative. MUSCULOSKELETAL: Multiple old healed right-sided rib fractures. There are also healing displaced fractures of the left 10th and 11th ribs.     CONCLUSION: 1.  No acute pulmonary parenchymal findings. No focal pneumonic infiltrate or pleural effusion. 2.  Healing displaced fractures of the left 10th and 11th ribs. Old healed right-sided rib fractures are also noted.         Physical Exam:  /80   Pulse (!) 115   Ht 5' 4\" (1.626 m)   " Wt (!) 276 lb (125.2 kg)   SpO2 92%   BMI 47.38 kg/m     General - Well nourished  Ears/Mouth -  OP pink moist, no thrush  Neck - no cervical lymphadenopathy  Lungs - Clear to ausculation bilaterally   CVS - regular rhythm with no murmurs, rubs or gallups  Abdomen - soft, NT, ND, NABS  Ext - no cyanosis, clubbing or edema  Skin - no rash  Psychology - alert and oriented, answers appropriate        Electronically signed by:    Aaron Davila MD PhD  [facility] Pulmonary and Critical Care Medicine

## 2021-06-21 NOTE — LETTER
Letter by Rajwinder Garza MD at      Author: Rajwinder Garza MD Service: -- Author Type: --    Filed:  Encounter Date: 11/20/2020 Status: (Other)         November 20, 2020     Patient: Placido Broderick   YOB: 1980   Date of Visit: 11/20/2020       To Whom It May Concern:    It is my medical opinion that Placido Broderick may return to work on Nov 23 with light duty.  No prolonged walking / standing.  He is to be off work Sunday Nov 22.    If you have any questions or concerns, please don't hesitate to call.    Sincerely,        Electronically signed by Rajwinder Garza MD

## 2021-06-23 NOTE — PROGRESS NOTES
[facility] Note    Patient Name: Placido Broderick  Patient Age: 38 y.o.  YOB: 1980  MRN: [id number]    Date of visit: 1/11/2019    Patient Active Problem List   Diagnosis     Class 3 obesity in adult     Nicotine Dependence     Prehypertension     Helicobacter Pylori (H. Pylori) Infection     Ankylosis Of The Right Foot     Plantar Fasciitis     Morbid Obesity     Pes Planus     Obstructive sleep apnea syndrome     Vitamin D deficiency     Adjustment disorder with mixed anxiety and depressed mood     Social History     Social History Narrative     Not on file     No family history on file.    Outpatient Encounter Medications as of 1/11/2019   Medication Sig Dispense Refill     ibuprofen (ADVIL,MOTRIN) 200 MG tablet Take 800 mg by mouth every 6 (six) hours as needed for pain.       albuterol (PROAIR HFA;PROVENTIL HFA;VENTOLIN HFA) 90 mcg/actuation inhaler Inhale 2 puffs every 4 (four) hours as needed for wheezing or shortness of breath. May use 4 puffs if needed. Use spacer. 1 Inhaler 0     buPROPion (WELLBUTRIN SR) 150 MG 12 hr tablet 1 DAILY FOR 3 DAYS THEN 1 TWICE DAILY 60 tablet 5     doxycycline (VIBRAMYCIN) 100 MG capsule Take 1 capsule (100 mg total) by mouth 2 (two) times a day for 7 days. 14 capsule 0     inhalational spacing device (BREATHERITE MDI SPACER) Spcr Use with inhaler. 1 each 0     valsartan-hydrochlorothiazide (DIOVAN HCT) 160-25 mg per tablet Take 1 tablet by mouth daily. 30 tablet 5     No facility-administered encounter medications on file as of 1/11/2019.        Chief Complaint:   Chief Complaint   Patient presents with     Cough     x 2-3 weeks       HPI:   Duration:14 days    Cough: Yes  Sore throat: Yes  Rhinitis/nasal congestion: not significant, seems to have persistent post nasal drip, though  Sinus pressure/pain: no  Fever: no    History of Asthma or other lung disease: no      ROS: Pertinent ros findings in hpi, all other systems negative.    Objective/Physical Exam:      BP (!) 134/104 (Patient Site: Right Arm, Patient Position: Sitting, Cuff Size: Adult Large)   Pulse (!) 122   Temp 98.3  F (36.8  C) (Oral)   Resp 20   Wt (!) 263 lb (119.3 kg)   SpO2 95%   BMI 46.59 kg/m      Heart: Regular rhythm, no rubs or gallops.  Normal rate: y  Murmur: n    Lungs:   Clear to auscultation bilaterally: n  Wheeze: mild expiratory  Rhonchi: n  Crackles: n  Area of decreased breath sounds: n  Labored breathing: n      Left eye:  Conjunctival erythema: n  Purulent drainage: n  Proptosis:n    Right eye:  Conjunctival erythema: n  Purulent drainage: n  Proptosis: n    Right tympanic membrane:   color: pale  ossicles visualized: y  umbo distorted: n  cone of light distorted: n  Air/fluid levels: n  Drainage:n  Canal: not edematous no discharge  Pain with external ear manipulation: n  Foreign body: n    Left tympanic membrane:   color: pale  ossicles visualized: y  umbo distorted: n  cone of light distorted: n  Air/fluid levels: n  Drainage: n  Canal: not edematous no discharge  Pain with external ear manipulation: n  Foreign body: n    No auricular protrusion or erythema/swelling over mastoid area bilaterally.    No white patches that scrape off, no deviation of uvula. no ulcerations noted.    No torticollis, neck stiffness, drooling, muffled/hot potato voice, submandibular swelling, trismus or stridor.    Pharynx:   Erythema: n   Pus pockets: n  Tender cervical lymphadenopathy: n    Well appearing: y  Moist mucous membranes: y    MSK: no muscle or joint swelling  Neuro: no dysarthria or gross asymmetry  Psych: full affect, oriented x 3  Hematologic: no petechiae or purpura    Skin: No rash.     Assessment/Plan:    Problem List Items Addressed This Visit     None      Visit Diagnoses     Cough    -  Primary    Relevant Orders    XR Chest 2 Views    Acute sinusitis, recurrence not specified, unspecified location        Relevant Medications    albuterol (PROAIR HFA;PROVENTIL HFA;VENTOLIN HFA)  90 mcg/actuation inhaler          Medications Ordered   Medications     albuterol (PROAIR HFA;PROVENTIL HFA;VENTOLIN HFA) 90 mcg/actuation inhaler     Sig: Inhale 2 puffs every 4 (four) hours as needed for wheezing or shortness of breath. May use 4 puffs if needed. Use spacer.     Dispense:  1 Inhaler     Refill:  0     doxycycline (VIBRAMYCIN) 100 MG capsule     Sig: Take 1 capsule (100 mg total) by mouth 2 (two) times a day for 7 days.     Dispense:  14 capsule     Refill:  0     inhalational spacing device (BREATHERITE MDI SPACER) Spcr     Sig: Use with inhaler.     Dispense:  1 each     Refill:  0       Persistent cough.  I wonder if the postnasal drip is contributing to the sore throat.  Throat is normal on exam.  May benefit from antibiotics since this may be sinusitis.  We will get an x-ray.  We will also try albuterol since he does have some wheezing on exam.  If not improving with this follow-up next week. Depression is going ok.     Patient Instructions   Whole Foods, Plant-Based Diet    Eat abundant vegetables.    Only eat whole grains.    2-4 fruits/day.    Enjoy at least 2 servings of legumes (beans) or tofu daily.      Avoid or greatly diminish animal products such as dairy, eggs and meat.     Supplement with 1,000mcg vitamin B12 and possibly a calcium/vitamin D supplement such as Caltrate+D3 daily.    Avoid processed foods, sugars and oils.    Cook your own food as much as possible.    [place] [employer] Documentary - watch online.    [email or web address] - information    Check blood pressure daily, if averaging above 140/90, let me know      Counseling included treatments, treatment options, risks and benefits and diagnosis.  The patient was interactive, attentive, verbalized understanding, and we discussed plan.     If condition worsens or you experience symptoms that are concerning to you, seek medical care immediately.    Lucian Aleman MD

## 2021-06-23 NOTE — PROGRESS NOTES
Chief Complaint   Patient presents with     Cough     x 3 weeks,      body pain left side     x 3 days , sharp pain when coughing, taking antibiotic not helping     Nasal Congestion     Wheezing     Shortness of Breath         HPI      Patient is here for 3 wks of severe nonproductive cough, with nasal discharge and congestion. H was given Doxycycline for sinusitis, along with Albuterol, without relief of cough. Cough was severe yesterday causing left ribs pain. He reported shortness of breath with wheezing, intermittently, that have not changed since the 19 visit. No fever, sinus pressure, nausea, vomiting, abdominal pain.      ROS: Pertinent ROS noted in HPI.     Allergies   Allergen Reactions     Hydrocodone-Acetaminophen Shortness Of Breath       Patient Active Problem List   Diagnosis     Class 3 obesity in adult     Nicotine Dependence     Prehypertension     Helicobacter Pylori (H. Pylori) Infection     Ankylosis Of The Right Foot     Plantar Fasciitis     Morbid Obesity     Pes Planus     Obstructive sleep apnea syndrome     Vitamin D deficiency     Adjustment disorder with mixed anxiety and depressed mood       No family history on file.    Social History     Socioeconomic History     Marital status:      Spouse name: Not on file     Number of children: Not on file     Years of education: Not on file     Highest education level: Not on file   Social Needs     Financial resource strain: Not on file     Food insecurity - worry: Not on file     Food insecurity - inability: Not on file     Transportation needs - medical: Not on file     Transportation needs - non-medical: Not on file   Occupational History     Not on file   Tobacco Use     Smoking status: Former Smoker     Last attempt to quit: 2018     Years since quittin.0     Smokeless tobacco: Never Used   Substance and Sexual Activity     Alcohol use: Yes     Alcohol/week: 8.4 oz     Types: 14 Cans of beer per week     Drug use: No      Sexual activity: Yes     Partners: Female   Other Topics Concern     Not on file   Social History Narrative     Not on file             Objective:      Vitals:    01/15/19 1715 01/15/19 1718   BP: 132/75    Patient Site: Right Arm    Patient Position: Sitting    Cuff Size: Adult Large    Pulse: (!) 112 (!) 102   Temp: 98.2  F (36.8  C)    SpO2: 91% 97%   Weight: (!) 267 lb (121.1 kg)        Gen:NAD  Throat: oropharynx clear, tonsils normal  Ears: TMs clear without effusion, ear canals normal with small cerumen  Nose: no discharge   Neck: No adenopathy  CV: RRR, normal S1S2, no M, R, G  Pulm: CTAB, normal effort  Chest wall: normal inspection, moderate tenderness to palpation at left lower lateral ribs.   Abd: normal bowel sounds, soft, no pain, no mass/HSM        Cough  -     codeine-guaiFENesin (GUAIFENESIN AC)  mg/5 mL liquid; Take 10 mL by mouth at bedtime as needed.  -     predniSONE (DELTASONE) 20 MG tablet; Take 40 mg by mouth daily for 5 days.    Normal pulm exam. Reviewed negative CXR from 1/11/19 visit. No further evaluations recommended as this is likely virally mediated, with RAD. Med side effects discussed. F/u as directed.

## 2021-06-23 NOTE — PATIENT INSTRUCTIONS - HE
Whole Foods, Plant-Based Diet    Eat abundant vegetables.    Only eat whole grains.    2-4 fruits/day.    Enjoy at least 2 servings of legumes (beans) or tofu daily.      Avoid or greatly diminish animal products such as dairy, eggs and meat.     Supplement with 1,000mcg vitamin B12 and possibly a calcium/vitamin D supplement such as Caltrate+D3 daily.    Avoid processed foods, sugars and oils.    Cook your own food as much as possible.    [employer] [employer] Documentary - watch online.    [email or web address] - information    Check blood pressure daily, if averaging above 140/90, let me know

## 2021-06-23 NOTE — PROGRESS NOTES
Assessment       1. Bronchitis  Patient is given a DuoNeb in clinic and does note some improvement in his respiratory symptoms and his oxygen level did rise to 97% on room air.  - predniSONE (DELTASONE) 20 MG tablet; 40mg daily for 5 days 20mg daily for 5 days.  Dispense: 15 tablet; Refill: 0  - Nebulizer with supplies (cup, tubing, mask, & filters)  - ipratropium-albuterol 0.5-2.5 mg/3 mL nebulizer solution 3 mL (DUO-NEB); Take 3 mL by nebulization once.  Patient is provided an albuterol nebulizer and supplies as well as a prescription for albuterol nebulizer solution to be utilized every 4 hours as needed for cough and wheeze        2. Gastroesophageal reflux disease without esophagitis  Patient's continue cough certainly may be related to his history of GERD will start patient on ranitidine 150 mg twice a daily he is also encouraged to slightly elevated  - ranitidine (ZANTAC) 150 MG tablet; Take 1 tablet (150 mg total) by mouth 2 (two) times a day.  Dispense: 60 tablet; Refill: 1    3. History of nicotine dependence  He is congratulated on his discontinuation of smoking recommend after patient has complete resolution of his upper respiratory illness that he schedule an appointment with his primary to discuss pulmonary function testing    Recommend follow-up in 2 days, sooner if needed.  Patient advised if symptoms persist, worsen or new symptoms arise they are to seek medical care.  All patients questions addressed. Patient verbalized understanding and agreement with plan.         Plan:     There are no Patient Instructions on file for this visit.  I am having Cummins Lo start on ranitidine, predniSONE, and albuterol. I am also having him maintain his ibuprofen, buPROPion, inhalational spacing device, albuterol, codeine-guaiFENesin, azithromycin, and valsartan-hydrochlorothiazide. We will continue to administer ipratropium-albuterol.  Orders Placed This Encounter   Procedures     Nebulizer with supplies (cup,  tubing, mask, & filters)   Followup: Return in about 2 days (around 2/7/2019), or if symptoms worsen or fail to improve. earlier if needed.  Subjective:      HPI: Placido Broderick is a 38 y.o. male presents to clinic today with continued complaints of cough, wheeze and shortness of breath.  Patient Has been seen for same or similar symptoms beginning on January 11, 2019 with reported cough at that time for 2-3 weeks.  Patient was subsequently diagnosed with a cough negative chest x-ray and acute sinusitis was given a prescription for pro-air inhaler with AeroChamber and doxycycline 100 mg twice daily for 7 days.  Patient reports having some difficulty with the inhaler and in fact found it more beneficial to utilize without the spacer.  Patient was seen again on 1/15/2019 with complaint of 3-week history of cough, body aches, nasal congestion, shortness of breath and wheezing patient was subsequently given a prescription for guaifenesin codeine cough medication and prednisone 40 mg daily x5.  Again patient reported a little improvement in his symptoms.  Patient was seen again for a third time on 1/20/2019 with persistent cough and worsening shortness of breath.  Patient reports he had been utilizing his albuterol inhaler was again diagnosed with an acute sinusitis and bacterial bronchitis with wheeze and patient was given a prescription for amoxicillin thousand milligrams twice daily, azithromycin 500 mg on day 1 250 mg for 4 days, Tessalon Perles 200 mg 3 times daily as needed, prednisone 60 mg daily for 2 days, 40 mg daily for 2 days, 20 mg daily for 2 days, 10 mg for 2 days.  Patient reported improvement in his symptoms while on the medications though after completion his symptoms returned.  He has a history of nicotine dependency began at the age of 16 smoking three quarters of a pack a day he quit January 1, 2019.  He reports continued cough, mild shortness of breath with wheeze without chest pain, chest pressure or  heart palpitations.  He denies any fever, chills or diaphoresis.      Patient has a reported history of GERD he does utilize some over-the-counter preparation for his heartburn symptoms and has found relief.  Past Medical History:   Diagnosis Date     Ankylosis Of The Right Foot     Created by Conversion      Helicobacter Pylori (H. Pylori) Infection     Created by Conversion      Morbid obesity (H)     Created by Conversion      Past Surgical History:   Procedure Laterality Date     INGUINAL HERNIA REPAIR Right      Hydrocodone-acetaminophen  Outpatient Medications Prior to Visit   Medication Sig Dispense Refill     albuterol (PROAIR HFA;PROVENTIL HFA;VENTOLIN HFA) 90 mcg/actuation inhaler Inhale 2 puffs every 4 (four) hours as needed for wheezing or shortness of breath. May use 4 puffs if needed. Use spacer. 1 Inhaler 0     buPROPion (WELLBUTRIN SR) 150 MG 12 hr tablet 1 DAILY FOR 3 DAYS THEN 1 TWICE DAILY 60 tablet 5     ibuprofen (ADVIL,MOTRIN) 200 MG tablet Take 800 mg by mouth every 6 (six) hours as needed for pain.       inhalational spacing device (BREATHERITE MDI SPACER) Spcr Use with inhaler. 1 each 0     valsartan-hydrochlorothiazide (DIOVAN HCT) 160-25 mg per tablet Take 1 tablet by mouth daily. 30 tablet 1     azithromycin (ZITHROMAX) 250 MG tablet Take 500 mg (2 x 250 mg tablets) on day 1 followed by 250 mg (1 tablet) on days 2-5. 6 tablet 0     codeine-guaiFENesin (GUAIFENESIN AC)  mg/5 mL liquid Take 10 mL by mouth at bedtime as needed. 118 mL 0     No facility-administered medications prior to visit.      No family history on file.  Social History     Social History Narrative     Not on file     Patient Active Problem List   Diagnosis     Class 3 obesity in adult     Nicotine Dependence     Prehypertension     Helicobacter Pylori (H. Pylori) Infection     Ankylosis Of The Right Foot     Plantar Fasciitis     Morbid Obesity     Pes Planus     Obstructive sleep apnea syndrome     Vitamin D  "deficiency     Adjustment disorder with mixed anxiety and depressed mood       Review of Systems  Gen: No fever or fatigue  Eyes: No eye discharge.   ENT: No nasal congestion. No pharyngitis. No otalgia.  Resp: See HPI  GI:No diarrhea, nausea or vomiting. No constipation.  :No dysuria, normal UOP  MS: No joint/bone/muscle tenderness.  Skin: No rashes  Neuro: No headaches. Normal  Lymph/Hematologic: No gland swelling        Objective:     Vitals:    02/05/19 1612   BP: 124/88   Pulse: (!) 108   Resp: 20   SpO2: 95%   Weight: (!) 263 lb (119.3 kg)   Height: 5' 3\" (1.6 m)       Physical Exam:   Gen -Pleasant cooperative male who is no acute distress he is alert and oriented   HEENT -head is atraumatic normocephalic, conjunctivae are clear, TMs are without erythema,or fluid.   Nose is clear.  Oropharynx is moist and clear, without tonsillar hypertrophy, asymmetry, exudate or lesions.  Neck - supple without adenopathy or thyromegaly.  Lungs -patient noted to have coarsely clear lung sounds wheezing noted throughout.  He does appear mildly short of breath.    Cardiac - regular rate and rhythm, normal S1 and S2.  Skin - clear without rash  Neuro -  moving all extremities equally, normal muscle tone in all 4 extremities      Xr Chest 2 Views    Result Date: 1/11/2019  XR CHEST 2 VIEWS 1/11/2019 9:31 AM INDICATION: Cough COMPARISON: None. FINDINGS: Negative chest.    No results found for this or any previous visit (from the past 240 hour(s)).      Peggy Spain, CNP  "

## 2021-06-23 NOTE — TELEPHONE ENCOUNTER
Who is calling:  Patient's wife, Breezy  Reason for Call:  Breezy called stating that patient's employer is calling him into work today but he is unable to go in due to still sick and on medication.  Breezy will like to know if Dr. Plasencia can write another letter to excuse patient from work.  Patient is due back to work on 1/22, but just for this weekend, to extend his leave until then.  Please advise.  Date of last appointment with primary care: 1/11/19  Has the patient been recently seen:  Yes  Okay to leave a detailed message: Yes    Informed Breezy that Writer is unsure if Dr. Plasencia is in today at the walk in clinics or not.

## 2021-06-23 NOTE — TELEPHONE ENCOUNTER
The letter I wrote for patient started that he should return to work 1/17/19, with restrictions applied until 1/22/19. If he is not able to return to work as directed, he should be re-evaluated in clinic.     Please call to inform patient that he should be seen again before another work noted can be considered.

## 2021-06-24 NOTE — PROGRESS NOTES
Chief Complaint   Patient presents with     Cough     cough, wheezing, SOB x 1 month states that was doing fine until meds ran out.       ASSESSMENT & PLAN:   Diagnoses and all orders for this visit:    Dyspnea on exertion    Chest tightness        MDM:  Patient story concerning for cardiac or noninfectious lung issues such as pulmonary embolus. Risk factors for both include hypertension, morbid obesity with BMI of 47, and smoking history.  Patient and wife say patient has not had any history of breathing issues such as asthma or COPD.  Patient is visibly short of breath at rest and with minimal activity.  Wife confirms that he cannot even walk a short distance to the bathroom without becoming short of breath.  Patient has no wheezing.  Sats are 95% on room air at this time.  Patient and wife are in agreement with going to [facility] emergency department for further workup.    Discussed patient with HEIDY Hughes at [facility] emergency department.  .    SUBJECTIVE    HPI:  Patient with a history of obesity, obstructive sleep apnea, GERD and multiple recent visits related to cough and bronchitis presents to the the walk-in clinic with worsening shortness of breath for the last two days. Wife and patient say he can't make it to the bathroom without becoming shortness of breath.  Is visibly shortness of breath at rest. No fevers, minimal dry cough.  Chest is tight, especially with activity.      Was last seen on February 5 given nebulizers with machine, ranitidine twice daily for GERD, and a prednisone burst.  Recently stopped taking ranitidine because he hasn't had heartburn sx.  He has received 2 rounds of antibiotics in the last month and a half.  Was recommended he discuss pulmonary function testing with his primary care provider which he has not done.      Smoking: Quit in December.          History obtained from the patient.    Past Medical History:   Diagnosis Date     Ankylosis Of The Right Foot     Created  by Conversion      Helicobacter Pylori (H. Pylori) Infection     Created by Conversion      Morbid obesity (H)     Created by Conversion        Problem List:  2018: Adjustment disorder with mixed anxiety and depressed mood  2016: Vitamin D deficiency  Class 3 obesity in adult  Acute pharyngitis  Nicotine Dependence  Prehypertension  Helicobacter Pylori (H. Pylori) Infection  Ankylosis Of The Right Foot  Plantar Fasciitis  Acute Upper Respiratory Infection  Morbid Obesity  Pes Planus  Obstructive sleep apnea syndrome      Social History     Tobacco Use     Smoking status: Former Smoker     Last attempt to quit: 2018     Years since quittin.1     Smokeless tobacco: Never Used   Substance Use Topics     Alcohol use: Yes     Alcohol/week: 8.4 oz     Types: 14 Cans of beer per week       Review of Systems   Constitutional: Negative for chills and fever.   HENT: Negative for congestion, sinus pressure, sinus pain, sneezing and sore throat.    Respiratory: Positive for cough, chest tightness and shortness of breath.    Cardiovascular: Negative for leg swelling.       OBJECTIVE    Vitals:    19 0847   BP: (!) 143/100   Patient Site: Right Arm   Patient Position: Sitting   Cuff Size: Adult Large   Pulse: 91   Temp: 97.1  F (36.2  C)   TempSrc: Oral   SpO2: 95%   Weight: (!) 268 lb 9.6 oz (121.8 kg)       Physical Exam   Constitutional: He is oriented to person, place, and time. He appears well-developed and well-nourished. No distress.   HENT:   Right Ear: External ear normal.   Left Ear: External ear normal.   Eyes: Conjunctivae are normal. Right eye exhibits no discharge. Left eye exhibits no discharge.   Cardiovascular: Normal rate, regular rhythm, normal heart sounds and intact distal pulses.   Pulmonary/Chest: No stridor. He is in respiratory distress (Visibly tachypneic and short of breath with minimal activity.). He has no wheezes. He has no rales.   Diminished throughout.   Musculoskeletal:  Normal range of motion.   Neurological: He is alert and oriented to person, place, and time.   Skin: Skin is warm and dry. Capillary refill takes less than 2 seconds. No pallor.   Psychiatric: He has a normal mood and affect. His behavior is normal. Judgment and thought content normal.       Labs:  No results found for this or any previous visit (from the past 240 hour(s)).      Radiology:    No results found.    PATIENT INSTRUCTIONS:   Patient Instructions

## 2021-06-24 NOTE — PROGRESS NOTES
ASSESSMENT & PLAN    No problem-specific Assessment & Plan notes found for this encounter.      Placido was seen today for cough and hospital visit follow up.    Diagnoses and all orders for this visit:    Reactive airway disease with wheezing, moderate persistent, uncomplicated  -     Ambulatory referral to Pulmonology    Obstructive sleep apnea syndrome    Hypersomnolence  -     Ambulatory referral to Sleep Medicine    Snoring  -     Ambulatory referral to Sleep Medicine    Other orders  -     valsartan-hydrochlorothiazide (DIOVAN HCT) 160-25 mg per tablet; Take 1 tablet by mouth daily.  -     predniSONE (DELTASONE) 5 MG tablet; Take 15 mg by mouth daily for 4 days, THEN 10 mg daily for 4 days, THEN 5 mg daily for 4 days.  -     budesonide-formoterol (SYMBICORT) 160-4.5 mcg/actuation inhaler; Inhale 2 puffs 2 (two) times a day.        Patient Instructions   Currently Armando you have prednisone tablets or 20 mg    I would like you to start today 20 mg tablets take 1 daily for 4 days    After this I would like you to begin prednisone prescription that I gave you with 5 mg tablets  Take this as directed  When you get to the last 4 days of this medication I would like you to start Symbicort 2 puffs twice daily and rinse her mouth out afterwards    I would like you to follow through with pulmonary doctors but  I would like you to have your sleep study    Try to identify any triggers that are causing wheezing consider allergy related issues such as mold or dust  Consider pets  Consider work environment  I would like to see her back in 6 weeks      Return in about 6 weeks (around 4/15/2019).            CHIEF COMPLAINT: Placido Broderick had concerns including Cough and Hospital Visit Follow Up.    Ewiiaapaayp: 1.............. had concerns including Cough and Hospital Visit Follow Up.    1. Reactive airway disease with wheezing, moderate persistent, uncomplicated    2. Obstructive sleep apnea syndrome    3. Hypersomnolence    4. Snoring           CC:             Why are you here today?                              Cough    What is the issue like in one word?:                                 Chronic  How long is it ongoing: days, weeks,months?:                 X 1 1/2 months  What makes it worse: activity? Eating? Movement? :      n/a  What makes it better?:                                                      medications  0/10-10/10:Pain/Intesity                                                    n/a    Any associated Sx to above complaint:   Was admitted to [facility] for breathing issues    Any other Problems in order of Priority:    Anoscopy due in 2026 use your Dulera daily as a prophylactic medication it is her controller medication    Use the albuterol as a rescue medication    By the joint of [place] and pick out follow-up from hospital admission date 2/27/2019 medications reconciled today    Patient had 5 visits outpatient as well as urgent care for cough with persistence that would improve after being treated with steroids antibiotics and then worsened again    He can identify no triggers    We discussed pets, allergens, even possibly his Diovan medication    Chest x-ray revealed central interstitial prominence with bronchial wall thickening CT scan was essentially within normal limits    He recently quit smoking tobacco was congratulated in the first of the year    Medications were reviewed    Allergies hydrocodone  Currently he is using both albuterol as well as DuoNeb    He is on a tapering dose of prednisone    SUBJECTIVE:  Placido Broderick is a 38 y.o. male    Past Medical History:   Diagnosis Date     Ankylosis Of The Right Foot     Created by Conversion      Helicobacter Pylori (H. Pylori) Infection     Created by Conversion      Morbid obesity (H)     Created by Conversion      Past Surgical History:   Procedure Laterality Date     INGUINAL HERNIA REPAIR Right      Hydrocodone-acetaminophen  Current Outpatient Medications    Medication Sig Dispense Refill     albuterol (ACCUNEB) 1.25 mg/3 mL nebulizer solution Take 1.25 mg by nebulization every 4 (four) hours as needed for wheezing or shortness of breath.       albuterol (PROAIR HFA;PROVENTIL HFA;VENTOLIN HFA) 90 mcg/actuation inhaler Inhale 2 puffs every 4 (four) hours as needed for wheezing or shortness of breath.       ipratropium-albuterol (DUO-NEB) 0.5-2.5 mg/3 mL nebulizer Take 3 mL by nebulization 4 (four) times a day. 360 mL 0     omeprazole (PRILOSEC) 20 MG capsule Take 1 capsule (20 mg total) by mouth at bedtime. 30 capsule 0     predniSONE (DELTASONE) 20 MG tablet Take 40 mg by mouth daily with breakfast for 7 days. 14 tablet 0     valsartan-hydrochlorothiazide (DIOVAN HCT) 160-25 mg per tablet Take 1 tablet by mouth daily. 90 tablet 3     budesonide-formoterol (SYMBICORT) 160-4.5 mcg/actuation inhaler Inhale 2 puffs 2 (two) times a day. 1 Inhaler 12     predniSONE (DELTASONE) 5 MG tablet Take 15 mg by mouth daily for 4 days, THEN 10 mg daily for 4 days, THEN 5 mg daily for 4 days. 24 tablet 0     No current facility-administered medications for this visit.      No family history on file.  Social History     Socioeconomic History     Marital status:      Spouse name: None     Number of children: None     Years of education: None     Highest education level: None   Occupational History     None   Social Needs     Financial resource strain: None     Food insecurity:     Worry: None     Inability: None     Transportation needs:     Medical: None     Non-medical: None   Tobacco Use     Smoking status: Former Smoker     Last attempt to quit: 2018     Years since quittin.2     Smokeless tobacco: Never Used   Substance and Sexual Activity     Alcohol use: Yes     Alcohol/week: 8.4 oz     Types: 14 Cans of beer per week     Drug use: No     Sexual activity: Yes     Partners: Female   Lifestyle     Physical activity:     Days per week: None     Minutes per session:  None     Stress: None   Relationships     Social connections:     Talks on phone: None     Gets together: None     Attends Restoration service: None     Active member of club or organization: None     Attends meetings of clubs or organizations: None     Relationship status: None     Intimate partner violence:     Fear of current or ex partner: None     Emotionally abused: None     Physically abused: None     Forced sexual activity: None   Other Topics Concern     None   Social History Narrative     None     Patient Active Problem List   Diagnosis     Class 3 obesity in adult     Nicotine Dependence     Prehypertension     Helicobacter Pylori (H. Pylori) Infection     Ankylosis Of The Right Foot     Plantar Fasciitis     Morbid Obesity     Pes Planus     Obstructive sleep apnea syndrome     Vitamin D deficiency     Adjustment disorder with mixed anxiety and depressed mood     Reactive airway disease with wheezing, moderate persistent, uncomplicated     Tachycardia     Benign essential HTN                                              SOCIAL: He  reports that he quit smoking about 2 months ago. he has never used smokeless tobacco. He reports that he drinks about 8.4 oz of alcohol per week. He reports that he does not use drugs.    REVIEW OF SYSTEMS:   Family history not pertinent to chief complaint or presenting problem    Review of Systems:      Nervous System:  No headache, paresthesia or dizziness or fainting                                  Ears: No hearing loss or ringing in the ears    Eyes: No blurring of vision, Double Vision            No redness, itching or dryness.    Nose: No nosebleed or loss of smell    Mouth: No mouth sores or  coated tongue    Throat: No hoarseness or difficulty swallowing    Neck: No enlarged thyroid or lymph nodes.    Heart: No chest pain, palpitation or irregular heartbeat.                  Lungs: No shortness of breath, continued intermittent wheezing or  hemoptysis.    Gastrointestinal: No nausea or vomiting, melena or blood in stools.    Kidney/Bladdr: No polyuria, polydipsia, or hematuria.                             Genital/Sexual: No Sex function Changes                                Skin: No rash    Muscles/Joints/Bones: No Muscle morning stiffness, No Effusion of a Joint     Review of systems otherwise negative as requested from patient, except   Those positive ROS outlined and discussed in [place].    OBJECTIVE:  /88 (Patient Site: Right Arm, Patient Position: Sitting, Cuff Size: Adult Large)   Pulse (!) 109   Wt (!) 266 lb 8 oz (120.9 kg)   SpO2 91%   BMI 45.74 kg/m      GENERAL:     No acute distress.   Alert and oriented X 3         Physical:    Slight swelling of the posterior oropharynx with very shallow oropharynx  TMs are clear  His mucosa congested  Lungs soft wheezing accentuated by cough  Cardiac regular rate and rhythm  No tremor  No lower extremity edema      ASSESSMENT & PLAN      Cummins was seen today for cough and hospital visit follow up.    Diagnoses and all orders for this visit:    Reactive airway disease with wheezing, moderate persistent, uncomplicated  -     Ambulatory referral to Pulmonology    Obstructive sleep apnea syndrome    Hypersomnolence  -     Ambulatory referral to Sleep Medicine    Snoring  -     Ambulatory referral to Sleep Medicine    Other orders  -     valsartan-hydrochlorothiazide (DIOVAN HCT) 160-25 mg per tablet; Take 1 tablet by mouth daily.  -     predniSONE (DELTASONE) 5 MG tablet; Take 15 mg by mouth daily for 4 days, THEN 10 mg daily for 4 days, THEN 5 mg daily for 4 days.  -     budesonide-formoterol (SYMBICORT) 160-4.5 mcg/actuation inhaler; Inhale 2 puffs 2 (two) times a day.        Return in about 6 weeks (around 4/15/2019).       Anticipatory Guidance and Symptomatic Cares Discussed   Advised to call back directly if there are further questions, or if these symptoms fail to improve as anticipated or  worsen.  Return to clinic if patient has a clinical concern that warrants an exam.         I spent 30  minutes with this patient face to face, of which 50% or greater was spent in counseling and coordination of care with regards to Cummins was seen today for cough and hospital visit follow up.    Diagnoses and all orders for this visit:    Reactive airway disease with wheezing, moderate persistent, uncomplicated  -     Ambulatory referral to Pulmonology    Obstructive sleep apnea syndrome    Hypersomnolence  -     Ambulatory referral to Sleep Medicine    Snoring  -     Ambulatory referral to Sleep Medicine    Other orders  -     valsartan-hydrochlorothiazide (DIOVAN HCT) 160-25 mg per tablet; Take 1 tablet by mouth daily.  -     predniSONE (DELTASONE) 5 MG tablet; Take 15 mg by mouth daily for 4 days, THEN 10 mg daily for 4 days, THEN 5 mg daily for 4 days.  -     budesonide-formoterol (SYMBICORT) 160-4.5 mcg/actuation inhaler; Inhale 2 puffs 2 (two) times a day.        Harry Bess MD  [facility] [id number] [phone]

## 2021-06-24 NOTE — PROGRESS NOTES
"TCM DISCHARGE FOLLOW UP CALL    Discharge Date:  2/28/2019  Reason for hospital stay (discharge diagnosis)::  Reactive airway disease  Are you feeling better, the same or worse since your discharge?:  Patient is feeling better (cough better, productive yellowish at times. Denies fever. Has occasional KEMP. Pt's voice is raspy when he laughs.)  Do you feel like you have a plan in the event of a health emergency?: Yes (he talks with his wife, will treat minor illness with OTCs, \"wait and see\" approach.)    As part of your discharge plan, were  home care services ordered for you?: No    Did you receive any new medications, or was there a change to your medications?: Yes    Are you taking those medications, or do you have any established regiment?:  Pt correctly reported dosing of nebs and prednisone.  Do you have any follow up visits scheduled with your PCP or Specialist?:  Yes, with PCP (3/4)  (RN) Is PCP appt scheduled soon enough (within 14 days of discharge date)?: Yes      "

## 2021-06-24 NOTE — PATIENT INSTRUCTIONS - HE
Currently Armando you have prednisone tablets or 20 mg    I would like you to start today 20 mg tablets take 1 daily for 4 days    After this I would like you to begin prednisone prescription that I gave you with 5 mg tablets  Take this as directed  When you get to the last 4 days of this medication I would like you to start Symbicort 2 puffs twice daily and rinse her mouth out afterwards    I would like you to follow through with pulmonary doctors but  I would like you to have your sleep study    Try to identify any triggers that are causing wheezing consider allergy related issues such as mold or dust  Consider pets  Consider work environment  I would like to see her back in 6 weeks

## 2021-06-25 NOTE — TELEPHONE ENCOUNTER
RN cannot approve Refill Request    RN can NOT refill this medication med is not covered by policy/route to provider. Last office visit: 1/6/2020 Aaron Davila MD Last Physical: Visit date not found Last MTM visit: Visit date not found Last visit same specialty: 11/20/2020 Rajwinder Garza MD.  Next visit within 3 mo: Visit date not found  Next physical within 3 mo: Visit date not found      Lorna Farley, [facility] Triage/Med Refill 6/1/2021    Requested Prescriptions   Pending Prescriptions Disp Refills     predniSONE (DELTASONE) 10 mg tablet 30 tablet 0     Sig: Take 4 tabs daily x 3 days, THEN 3 tabs daily x 3 days, THEN 2 tabs daily x 3 days, THEN 1 tab daily x 3 days..       There is no refill protocol information for this order

## 2021-06-27 NOTE — PROGRESS NOTES
Progress Notes by Tomy Thompson DO at 1/20/2019 12:10 PM     Author: Tomy Thompson DO Service: -- Author Type: Physician    Filed: 1/21/2019  9:16 AM Encounter Date: 1/20/2019 Status: Signed    : Tomy Thompson DO (Physician)       Chief Complaint   Patient presents with   ? Cough     x3wks, wheezing, completed a course of prednisone today but is still experiencing symptoms, haven't been taking BP med        History of Present Illness: Rooming staff notes reviewed.  Patient has a chief concern of persistent cough for the past 3 weeks, and worsening shortness of breath.  He has albuterol at home, which does give some relief of shortness of breath, but he had not use this since the previous day from exam.  Patient has had some back pain with coughing, but it is better from 5 days ago. Patient had some fever and chills yesterday. Patient has not taken his blood pressure medication for 3 months, for no specific reason. He can check his blood pressure at work he states.    Review of systems: See history of present illness, all others negative.     Current Outpatient Medications   Medication Sig Dispense Refill   ? albuterol (PROAIR HFA;PROVENTIL HFA;VENTOLIN HFA) 90 mcg/actuation inhaler Inhale 2 puffs every 4 (four) hours as needed for wheezing or shortness of breath. May use 4 puffs if needed. Use spacer. 1 Inhaler 0   ? codeine-guaiFENesin (GUAIFENESIN AC)  mg/5 mL liquid Take 10 mL by mouth at bedtime as needed. 118 mL 0   ? ibuprofen (ADVIL,MOTRIN) 200 MG tablet Take 800 mg by mouth every 6 (six) hours as needed for pain.     ? inhalational spacing device (BREATHERITE MDI SPACER) Spcr Use with inhaler. 1 each 0   ? amoxicillin (AMOXIL) 500 MG capsule Take 2 capsules (1,000 mg total) by mouth 2 (two) times a day for 10 days. 40 capsule 0   ? azithromycin (ZITHROMAX) 250 MG tablet Take 500 mg (2 x 250 mg tablets) on day 1 followed by 250 mg (1 tablet) on days 2-5. 6 tablet 0   ? benzonatate (TESSALON)  200 MG capsule Take 1 capsule (200 mg total) by mouth 3 (three) times a day as needed for cough. 20 capsule 0   ? buPROPion (WELLBUTRIN SR) 150 MG 12 hr tablet 1 DAILY FOR 3 DAYS THEN 1 TWICE DAILY 60 tablet 5   ? predniSONE (DELTASONE) 20 MG tablet Take 60 mg by mouth daily for 2 days, THEN 40 mg daily for 2 days, THEN 20 mg daily for 2 days, THEN 10 mg daily for 2 days. 13 tablet 0   ? valsartan-hydrochlorothiazide (DIOVAN HCT) 160-25 mg per tablet Take 1 tablet by mouth daily. 30 tablet 1     No current facility-administered medications for this visit.      Past Medical History:   Diagnosis Date   ? Ankylosis Of The Right Foot     Created by Conversion    ? Helicobacter Pylori (H. Pylori) Infection     Created by Conversion    ? Morbid obesity (H)     Created by Conversion       Past Surgical History:   Procedure Laterality Date   ? INGUINAL HERNIA REPAIR Right       Social History     Tobacco Use   ? Smoking status: Former Smoker     Last attempt to quit: 2018     Years since quittin.0   ? Smokeless tobacco: Never Used   Substance Use Topics   ? Alcohol use: Yes     Alcohol/week: 8.4 oz     Types: 14 Cans of beer per week   ? Drug use: No        No family history on file.    Vitals:    19 1216 19 1220   BP: (!) 158/116 (!) 161/122   Patient Site: Right Arm Right Arm   Patient Position: Sitting Sitting   Cuff Size: Adult Large Adult Large   Pulse: 97 97   Resp: 18    Temp: 97.7  F (36.5  C)    TempSrc: Oral    SpO2: 95%    Weight: (!) 268 lb 9.6 oz (121.8 kg)        EXAM:   General: Vital signs reviewed.  Patient is in no acute appearing distress.  Breathing is mildly labored appearing on initial exam.  Patient is alert and oriented ×3.  After the nebulizer treatment, breathing looked less labored.  He coughed less.  ENT: Ear exam shows left tympanic membrane dullness and injection with the right tympanic membrane being clear without injection, left nasal turbinates are injected and  edematous with purulent rhinorrhea, no pharyngeal injection with increased post nasal drainage noted.  Heart:  Heart rate is normal, regular rhythm without murmur.  Lungs:  Initial lung exam shows lung sounds are clear to auscultation with poor airflow and crackles noted in right lower lobe.  After the neb treatment, patient had less shortness of breath, with slightly better air flow and continued crackles in right lower lobe. He had some generalized wheezing after the nebulizer treatment.  Skin: Warm and dry.    I explained the patient that I felt he had a combination of illnesses, and that I have very much wanted him to follow-up with his primary care provider.  He seemed sincere and telling me that he would.  Assessment/Plan   1. SOB (shortness of breath)  albuterol nebulizer solution 3 mL (PROVENTIL)   2. Acute bacterial sinusitis  amoxicillin (AMOXIL) 500 MG capsule   3. Acute serous otitis media of left ear, recurrence not specified  amoxicillin (AMOXIL) 500 MG capsule   4. Bronchitis  benzonatate (TESSALON) 200 MG capsule   5. Cough  benzonatate (TESSALON) 200 MG capsule   6. Pneumonia of right lower lobe due to infectious organism (H)  azithromycin (ZITHROMAX) 250 MG tablet   7. Hypertension, unspecified type         Patient Instructions     Start you blood pressure medication today. Check you blood pressure at work, and be seen if it is not trending lower with treatment. Please follow up with our primary care provider to address today's multiple issues in 7-10 days. Be seen sooner if feeling worse.  Patient Education     Acute Bacterial Rhinosinusitis (ABRS)    Acute bacterial rhinosinusitis (ABRS) is an infection of your nasal cavity and sinuses. Its caused by bacteria. Acute means that youve had symptoms for less than 4 weeks, but possibly up to 12 weeks.  Understanding your sinuses  The nasal cavity is the large air-filled space behind your nose. The sinuses are a group of spaces formed by the bones of  your face. They connect with your nasal cavity. ABRS causes the tissue lining these spaces to become inflamed. Mucus may not drain normally. This leads to facial pain and other symptoms.  What causes ABRS?  ABRS most often follows an upper respiratory infection caused by a virus. Bacteria then infect the lining of your nasal cavity and sinuses. But you can also get ABRS if you have:    Nasal allergies    Long-term nasal swelling and congestion not caused by allergies    Blockage in the nose  Symptoms of ABRS  The symptoms of ABRS may be different for each person and include:    Nasal congestion or blockage    Pain or pressure in the face    Thick, colored drainage from the nose  Other symptoms may include:    Runny nose    Fluid draining from the nose down the throat (postnasal drip)    Headache    Cough    Pain    Fever  Diagnosing ABRS  ABRS may be diagnosed if youve had an upper respiratory infection like a cold and cough for 10 or more days without improvement or with worsening symptoms. Your healthcare provider will ask about your symptoms and your medical history. The provider will check your vital signs, including your temperature. Youll have a physical exam. The healthcare provider will check your ears, nose, and throat. You likely wont need any tests. If ABRS comes back, you may have a culture or other tests.  Treatment for ABRS  Treatment may include:    Antibiotic medicine. This is for symptoms that last for at least 10 to 14 days.    Nasal corticosteroid medicine. Drops or spray used in the nose can lessen swelling and congestion.    Over-the-counter pain medicine. This is to lessen sinus pain and pressure.    Nasal decongestant medicine. Spray or drops may help to lessen congestion. Do not use them for more than a few days.    Salt wash (saline irrigation). This can help to loosen mucus.  Possible complications of ABRS  ABRS may come back or become long-term (chronic). In rare cases, ABRS may cause  complications such as:     Inflamed tissue around the brain and spinal cord (meningitis)    Inflamed tissue around the eyes (orbital cellulitis)    Inflamed bones around the sinuses (osteitis)  These problems may need to be treated in a hospital with intravenous (IV) antibiotic medicine or surgery.  When to call the healthcare provider  Call your healthcare provider if you have any of the following:    Symptoms that dont get better, or get worse    Symptoms that dont get better after 3 to 5 days on antibiotics    Trouble seeing    Swelling around your eyes    Confusion or trouble staying awake   Date Last Reviewed: 5/1/2017 2000-2017 [employer]. [address] [place] [address]. All rights reserved. This information is not intended as a substitute for professional medical care. Always follow your healthcare professional's instructions.           Patient Education     Viral or Bacterial Bronchitis with Wheezing (Adult)    Bronchitis is an infection of the air passages. It often occurs during a cold and is usually caused by a virus. Symptoms include cough with mucus (phlegm) and low-grade fever. This illness is contagious during the first few days and is spread through the air by coughing and sneezing, or by direct contact (touching the sick person and then touching your own eyes, nose, or mouth).  If there is a lot of inflammation, air flow is restricted. The air passages may also go into spasm, especially if you have asthma. This causes wheezing and difficulty breathing even in people who do not have asthma.  Bronchitis usually lasts 7 to 14 days. The wheezing should improve with treatment during the first week. An inhaler is often prescribed to relax the air passages and stop wheezing. Antibiotics will be prescribed if your doctor thinks there is also a secondary bacterial infection.  Home care    If symptoms are severe, rest at home for the first 2 to 3 days. When you go back to your  usual activities, don't let yourself get too tired.    Do not smoke. Also avoid being exposed to secondhand smoke.    You may use over-the-counter medicine to control fever or pain, unless another medicine was prescribed. Note: If you have chronic liver or kidney disease or have ever had a stomach ulcer or gastrointestinal bleeding, talk with your healthcare provider before using these medicines. Also talk to your provider if you are taking medicine to prevent blood clots.) Aspirin should never be given to anyone younger than 18 years of age who is ill with a viral infection or fever. It may cause severe liver or brain damage.    Your appetite may be poor, so a light diet is fine. Avoid dehydration by drinking 6 to 8 glasses of fluids per day (such as water, soft drinks, sports drinks, juices, tea, or soup). Extra fluids will help loosen secretions in the nose and lungs.    Over-the-counter cough, cold, and sore-throat medicines will not shorten the length of the illness, but they may be helpful to reduce symptoms. (Note: Do not use decongestants if you have high blood pressure.)    If you were given an inhaler, use it exactly as directed. If you need to use it more often than prescribed, your condition may be worsening. If this happens, contact your healthcare provider.    If prescribed, finish all antibiotic medicine, even if you are feeling better after only a few days.  Follow-up care  Follow up with your healthcare provider, or as advised. If you had an X-ray or ECG (electrocardiogram), a specialist will review it. You will be notified of any new findings that may affect your care.  If you are age 65 or older, or if you have a chronic lung disease or condition that affects your immune system, or you smoke, ask your healthcare provider about getting a pneumococcal vaccine and a yearly flu shot (influenza vaccine).  When to seek medical advice  Call your healthcare provider right away if any of these  occur:    Fever of 100.4 F (38 C) or higher, or as directed by your healthcare provider    Coughing up increasing amounts of colored sputum    Weakness, drowsiness, headache, facial pain, ear pain, or a stiff neck  Call 911  Call 911 if any of these occur.    Coughing up blood    Worsening weakness, drowsiness, headache, or stiff neck    Increased wheezing not helped with medication, shortness of breath, or pain with breathing  Date Last Reviewed: 9/13/2015 2000-2017 [employer]. [address] [place] [address]. All rights reserved. This information is not intended as a substitute for professional medical care. Always follow your healthcare professional's instructions.           Patient Education     When You Have Pneumonia  You have been diagnosed with pneumonia. This is a serious lung infection. Most cases of pneumonia are caused by bacteria. Pneumonia most often occurs in older adults, young children, and people with chronic health problems.  Home care    Take your medicine exactly as directed. Dont skip doses. Continue taking your antibiotics as until they are all gone, even if you start to feel better. This will prevent the pneumonia from coming back.    Drink at least 8 glasses of water daily, unless directed otherwise. This helps to loosen and thin secretions so that you can cough them up.    Use a cool-mist humidifier in your bedroom. Be sure to clean the humidifier daily.    Dont use medicines to suppress your cough unless your cough is dry, painful, or interferes with your sleep. Coughing up mucus is normal. You may use an expectorant if your healthcare provider says its okay.    You can use warm compresses or a heating pad on the lowest setting to relieve chest discomfort. Use several times a day for 15-20 minutes at a time. To prevent injury to your skin, set the temperature to warm, not hot. Dont put the compress or pad directly on your skin. Make certain it has a cover or  wrap it in a towel. This is to prevent skin burns.    Get plenty of rest until your fever, shortness of breath, and chest pain go away.    Plan to get a flu shot every year. The flu is a common cause of pneumonia. Getting a flu shot every year can help prevent both the flu and pneumonia.  Getting the pneumococcal vaccine  Talk with your healthcare provider about getting the pneumococcal vaccine. Pneumococcal pneumonia is caused by bacteria that spread from person to person. It can cause minor problems, such as ear infections. But it can also turn into life-threatening illnesses of the lungs (pneumonia), the covering of the brain and spinal cord (meningitis), and the blood (bacteremia).  Children under 2 years of age, adults over age 65, people with certain health conditions, and smokers are at the highest risk of pneumococcal disease. This vaccine can help prevent pneumococcal disease in both adults and children. Some people should not have the vaccine. Make sure to ask your healthcare provider if you should have the vaccine.   Follow-up care  Make a follow-up appointment as directed by our staff.  When to call your healthcare provider  Call your healthcare provider right away if you have any of the following:    Fever of 100.4 F (38 C) or higher, or as directed by your healthcare provider    Mucus from the lungs (sputum) thats yellow, green, bloody, or smells bad    A large amount of sputum    Vomiting    Symptoms that get worse  Call 911  Call 911 right away if you have any of the following:    Chest pain    Trouble breathing    Blue lips or fingernails   Date Last Reviewed: 11/1/2016 2000-2017 [employer]. [address] [place] [address], [place] [address]. All rights reserved. This information is not intended as a substitute for professional medical care. Always follow your healthcare professional's instructions.           Patient Education     Reducing the Risk of Middle Ear Infections     Good  handwashing can help your child prevent ear infections.     Most children have had at least one middle ear infection by the age of 2. Treatment may depend on whether the problem is acute or chronic. It also depends on how often it comes back and how long it lasts.  Reducing risk factors  Some behaviors or surroundings increase your monika risk of ear infection. Reducing such risk factors can be helpful at any point in treatment. The tips below may help:    If your child goes to group , he or she runs a greater risk of getting colds or flu. This may then lead to an ear infection. Help prevent these illnesses by teaching your child to wash his or her hands often.    If your child has nasal allergies, do your best to control dust, mold, mildew, and pet hair in the house. Also stop or greatly limit your monika contact with secondhand smoke.    If food allergies are a problem, identify the food that triggers the reaction. Help your child avoid it.  Watching and waiting  Sometimes it is better to proceed with caution in the following ways:    If your child is diagnosed with an ear infection, the healthcare provider may prescribe antibiotics and will suggest a period of watchful waiting. This means not filling any prescriptions right away. Instead of antibiotics, a trial of medicines to relieve symptoms including those for pain or fever, is advised. This is along with waiting some time to see if a child improves without antibiotic therapy. Whether or not your healthcare provider prescribes immediate antibiotics or a period of watchful waiting depends on your child's age and risk factors.    During this time, your child should be watched to see if his or her symptoms are improving and to make sure new symptoms, such as fever or vomiting, don't develop. If a child doesn't improve within a few days or develops new symptoms, antibiotics will usually be started.    Date Last Reviewed: 12/1/2016 2000-2017 [employer]. [address] [place] [address]. All rights reserved. This information is not intended as a substitute for professional medical care. Always follow your healthcare professional's instructions.           Patient Education     Common Middle Ear Problems    Your middle ear may have been injured or infected recently. Over time, certain growths or bone disease can also harm the middle ear. Left untreated, middle ear problems often lead to lifelong hearing loss. There are two types of hearing loss: conductive and sensorineural. One or both kinds can occur. Injury, infection, certain growths, or bone disease can cause your symptoms. A ruptured eardrum or a long-lasting (chronic) ear infection may be painful and decrease hearing.  Symptoms    Hearing loss in one or both ears    Fluid, often smelly, draining from the ear    Pain, pressure, or discomfort in the ear    Ringing in the ear  Conductive and sensorineural hearing loss  Sound waves may be disrupted before they reach the inner ear. If this happens, conductive hearing loss may occur. The ear canal can be blocked by wax, infection, a tumor, or a foreign object. The eardrum can be injured or infected. Abnormal bone growth, infection, or tumors in the middle ear can block sound waves.  Sound waves may not be processed correctly in the inner ear. If this happens, sensorineural hearing loss may occur. Permanent hearing loss is most commonly associated with sensorineural problems.  The tests and evaluations used to diagnose what type of hearing problem you have will depend on your symptoms.   Date Last Reviewed: 10/1/2016    7258-3524 [employer]. [address] [place] [address]. All rights reserved. This information is not intended as a substitute for professional medical care. Always follow your healthcare professional's instructions.           Patient Education     Step-by-Step  Using an Inhaler (in Mouth) Without a  Spacer    Date Last Reviewed: 2/1/2017 2000-2017 [employer]. [address] [place], [place] [id number]. All rights reserved. This information is not intended as a substitute for professional medical care. Always follow your healthcare professional's instructions.           Patient Education     Using an Inhaler with a Spacer  To control asthma, you need to use your medicines the right way. Some medicines are inhaled using a device called a metered-dose inhaler (MDI). Metered-dose inhalers deliver medicine with a fine spray. You may be asked to use a spacer (holding tube) with your inhaler. The spacer helps make sure all the medicine you need goes into your lungs.   Steps for using an inhaler with a spacer  Step 1:    Remove the caps from the inhaler and spacer.    Shake the inhaler well and attach the spacer. If the inhaler is being used for the first time or has not been used for a while, prime it as directed by the product maker.  Step 2:    Breathe out normally.    Put the spacer between your teeth. Close your lips tightly around it.    Keep your chin up.  Step 3:    Spray 1 puff into the spacer by pressing down on the inhaler.    Then breathe in through your mouth as slowly and deeply as you can. This should take about 5-10 seconds. If you breathe too quickly, you may hear a whistling sound in certain spacers.  Step 4:    Take the spacer out of your mouth.    Hold your breath for a count of 10.    Then hold your lips together and slowly breathe out through your mouth.          If youre prescribed more than 1 puff of medicine at a time, wait at least 30 seconds between puffs. This number may be different for different medicines. Shake the inhaler again. Then repeat steps 2 to 4.   Date Last Reviewed: 10/1/2016    9260-8727 [employer]. [address] [place] [address]. All rights reserved. This information is not intended as a substitute for professional medical care.  Always follow your healthcare professional's instructions.              Tomy Thompson, DO

## 2021-07-13 ENCOUNTER — PATIENT OUTREACH (OUTPATIENT)
Dept: PULMONOLOGY | Facility: OTHER | Age: 41
End: 2021-07-13

## 2021-07-21 NOTE — PATIENT INSTRUCTIONS - HE
1) I want you to stay on the symbicort. Two puffs twice a day regardless of how you feel.  Use your spacer and rinse your mouth  2) Take a singulair every night before bed  3) Always have your albuterol with you wherever you go  4) The nebulizer can be used when really short of breath, in time, you may not need it in the morning  5) Try a daily over the counter antihistamine to keep your sinuses open and this will also help your asthma control  6) Reserve the prednisone packs for emergency use only.  7) come back to see me in 2 months  
Negative

## 2021-08-10 DIAGNOSIS — J45.909 ASTHMA: Primary | ICD-10-CM

## 2021-08-10 RX ORDER — MONTELUKAST SODIUM 10 MG/1
10 TABLET ORAL AT BEDTIME
Qty: 30 TABLET | Refills: 11 | Status: SHIPPED | OUTPATIENT
Start: 2021-08-10 | End: 2022-07-12

## 2021-09-08 ENCOUNTER — TELEPHONE (OUTPATIENT)
Dept: PULMONOLOGY | Facility: OTHER | Age: 41
End: 2021-09-08

## 2021-09-08 DIAGNOSIS — J45.909 ASTHMA: Primary | ICD-10-CM

## 2021-09-08 RX ORDER — PREDNISONE 10 MG/1
TABLET ORAL
Qty: 30 TABLET | Refills: 0 | Status: SHIPPED | OUTPATIENT
Start: 2021-09-08 | End: 2021-09-27

## 2021-09-08 NOTE — TELEPHONE ENCOUNTER
Wife had called earlier to see if Lo should stay home from work. She has tested positive for Covid and now he is having ball the same symptoms , but his test was negative. Instructed to stay home and letter written per Dr. Mitchell and message left for wife.Letter is available in [employer].

## 2021-09-19 ENCOUNTER — HEALTH MAINTENANCE LETTER (OUTPATIENT)
Age: 41
End: 2021-09-19

## 2021-09-27 ENCOUNTER — VIRTUAL VISIT (OUTPATIENT)
Dept: FAMILY MEDICINE | Facility: CLINIC | Age: 41
End: 2021-09-27
Payer: COMMERCIAL

## 2021-09-27 DIAGNOSIS — G47.33 OBSTRUCTIVE SLEEP APNEA SYNDROME: ICD-10-CM

## 2021-09-27 DIAGNOSIS — I10 BENIGN ESSENTIAL HTN: ICD-10-CM

## 2021-09-27 DIAGNOSIS — D84.9 IMMUNOSUPPRESSION (H): Primary | ICD-10-CM

## 2021-09-27 DIAGNOSIS — J45.50 SEVERE PERSISTENT ASTHMA WITHOUT COMPLICATION (H): ICD-10-CM

## 2021-09-27 PROCEDURE — 99214 OFFICE O/P EST MOD 30 MIN: CPT | Mod: 95 | Performed by: FAMILY MEDICINE

## 2021-09-27 RX ORDER — PREDNISONE 10 MG/1
TABLET ORAL
Qty: 40 TABLET | Refills: 1 | Status: SHIPPED | OUTPATIENT
Start: 2021-09-27 | End: 2021-10-13

## 2021-09-27 ASSESSMENT — ASTHMA QUESTIONNAIRES
QUESTION_3 LAST FOUR WEEKS HOW OFTEN DID YOUR ASTHMA SYMPTOMS (WHEEZING, COUGHING, SHORTNESS OF BREATH, CHEST TIGHTNESS OR PAIN) WAKE YOU UP AT NIGHT OR EARLIER THAN USUAL IN THE MORNING: TWO OR THREE NIGHTS A WEEK
ACT_TOTALSCORE: 13
QUESTION_1 LAST FOUR WEEKS HOW MUCH OF THE TIME DID YOUR ASTHMA KEEP YOU FROM GETTING AS MUCH DONE AT WORK, SCHOOL OR AT HOME: SOME OF THE TIME
QUESTION_5 LAST FOUR WEEKS HOW WOULD YOU RATE YOUR ASTHMA CONTROL: SOMEWHAT CONTROLLED
QUESTION_2 LAST FOUR WEEKS HOW OFTEN HAVE YOU HAD SHORTNESS OF BREATH: THREE TO SIX TIMES A WEEK
QUESTION_4 LAST FOUR WEEKS HOW OFTEN HAVE YOU USED YOUR RESCUE INHALER OR NEBULIZER MEDICATION (SUCH AS ALBUTEROL): ONE OR TWO TIMES PER DAY

## 2021-09-27 NOTE — PROGRESS NOTES
"Placido is a 41 year old who is being evaluated via a billable telephone visit    How would you like to obtain your AVS? MyChart  If the video visit is dropped, the invitation should be resent by: Text to cell phone: [phone]  Will anyone else be joining your video visit? No      Patient never connected via video  Telephone visit performed      Exposed to wife with Covid  Was clinically ill  Took prednisone for 5 days  Now is better  Continues to use regular medications    Currently is not being treated for sleep apnea    Blood pressure medicine feels like it is doing well    Problem List Items Addressed This Visit        Respiratory    Obstructive sleep apnea syndrome     Still sleeping OK   Wife will OK            Severe persistent asthma without complication     Prednisone last week   3 daily  2 daily 4 days     Wife Covid       Montelukast 10 mg HS  Symbicort 160-4.5  2 puffs BID     Albuterol Inh   Rescue    Once in awhile      Tobacco Avoiding     Nebulizer   Couple of weeks had to use 2-3 days   Was Q3 hours              Relevant Orders    HC ADMIN COVID VAC PFIZER, 3RD DOSE IMM COMP PT       Circulatory    Benign essential HTN     Amlodipine 5 mg   Daily  \"works great\"    Goals   <140 top    <90 bottom            Other Visit Diagnoses     Immunosuppression (H)    -  Primary    Relevant Orders    HC ADMIN COVID VAC PFIZER, 3RD DOSE IMM COMP PT        Patient Instructions   Continue to use your medications as you are directed    Smoking cessation has to be part of your care plan    I have refilled the prednisone to be used in tapering doses    This is for exacerbations of asthma    Goal blood pressure for you Елена is less than 140 and the top number less than 90 and the bottom number    I also put in a request for a Covid booster as you are taking prednisone and have severe asthma    You can make this appointment after October 2, 2021    John      Alina Cummins is a 41 year old who presents for the " following health issues   Self      HPI     Asthma   Cough     Review of Systems   Cough   Asthma         Objective           Vitals:  No vitals were obtained today due to virtual visit.    Physical Exam   Doximity               Video-Visit Details    Type of service:  Converted to telephone visit unable to access video  Telephone interview     Originating Location (pt. Location): Home    Distant Location (provider location):  [facility]     Platform used for Video Visit: telephone total time 15 minutes phone visit

## 2021-09-27 NOTE — ASSESSMENT & PLAN NOTE
Prednisone last week   3 daily  2 daily 4 days     Wife Covid       Montelukast 10 mg HS  Symbicort 160-4.5  2 puffs BID     Albuterol Inh   Rescue    Once in awhile      Tobacco Avoiding     Nebulizer   Couple of weeks had to use 2-3 days   Was Q3 hours

## 2021-09-27 NOTE — PATIENT INSTRUCTIONS
Continue to use your medications as you are directed    Smoking cessation has to be part of your care plan    I have refilled the prednisone to be used in tapering doses    This is for exacerbations of asthma    Goal blood pressure for you Елена is less than 140 and the top number less than 90 and the bottom number    I also put in a request for a Covid booster as you are taking prednisone and have severe asthma    You can make this appointment after October 2, 2021    John

## 2021-09-28 ASSESSMENT — ASTHMA QUESTIONNAIRES: ACT_TOTALSCORE: 13

## 2021-10-08 ENCOUNTER — IMMUNIZATION (OUTPATIENT)
Dept: NURSING | Facility: CLINIC | Age: 41
End: 2021-10-08
Payer: COMMERCIAL

## 2021-10-27 ENCOUNTER — NURSE TRIAGE (OUTPATIENT)
Dept: NURSING | Facility: CLINIC | Age: 41
End: 2021-10-27

## 2021-10-27 NOTE — TELEPHONE ENCOUNTER
"Patient calling, and states he received a note from his son's school, stating that his son should be quarantined for 10 days , but that he does not need to quarantine.    Patient is asking if he should still go yo work.    He was advised to call the school back, and ask why he should not also quarantine.    Patient expressed understanding.    Cheri Son RN RN  [facility] Triage/refill nurse  COVID 19 Nurse Triage Plan/Patient Instructions    Please be aware that novel coronavirus (COVID-19) may be circulating in the community. If you develop symptoms such as fever, cough, or SOB or if you have concerns about the presence of another infection including coronavirus (COVID-19), please contact your health care provider or visit [email or web address].     Disposition/Instructions    Home care recommended. Follow home care protocol based instructions.  Additional COVID19 information to add for patients.   How can I protect others?  If you have symptoms (fever, cough, body aches or trouble breathing): Stay home and away from others (self-isolate) until:    At least 10 days have passed since your symptoms started, And     You ve had no fever--and no medicine that reduces fever--for 1 full day (24 hours), And      Your other symptoms have resolved (gotten better).     If you don t have symptoms, but a test showed that you have COVID-19 (you tested positive):    Stay home and away from others (self-isolate). Follow the tips under \"How do I self-isolate?\" below for 10 days (20 days if you have a weak immune system).    You don't need to be retested for COVID-19 before going back to school or work. As long as you're fever-free and feeling better, you can go back to school, work and other activities after waiting the 10 or 20 days.     How do I self-isolate?    Stay in your own room, even for meals. Use your own bathroom if you can.     Stay away from others in your home. No hugging, kissing or shaking hands. No " visitors.    Don t go to work, school or anywhere else.     Clean  high touch  surfaces often (doorknobs, counters, handles, etc.). Use a household cleaning spray or wipes. You ll find a full list on the EPA website:  www.epa.gov/pesticide-registration/list-n-disinfectants-use-against-sars-cov-2.    Cover your mouth and nose with a mask, tissue or washcloth to avoid spreading germs.    Wash your hands and face often. Use soap and water.    Caregivers in these groups are at risk for severe illness due to COVID-19:  o People 65 years and older  o People who live in a nursing home or long-term care facility  o People with chronic disease (lung, heart, cancer, diabetes, kidney, liver, immunologic)  o People who have a weakened immune system, including those who:  - Are in cancer treatment  - Take medicine that weakens the immune system, such as corticosteroids  - Had a bone marrow or organ transplant  - Have an immune deficiency  - Have poorly controlled HIV or AIDS  - Are obese (body mass index of 40 or higher)  - Smoke regularly    Caregivers should wear gloves while washing dishes, handling laundry and cleaning bedrooms and bathrooms.    Use caution when washing and drying laundry: Don t shake dirty laundry, and use the warmest water setting that you can.    For more tips, go to www.cdc.gov/coronavirus/2019-ncov/downloads/10Things.pdf.    How can I take care of myself?  1. Get lots of rest. Drink extra fluids (unless a doctor has told you not to).     2. Take Tylenol (acetaminophen) for fever or pain. If you have liver or kidney problems, ask your family doctor if it s okay to take Tylenol.     Adults can take either:     650 mg (two 325 mg pills) every 4 to 6 hours, or     1,000 mg (two 500 mg pills) every 8 hours as needed.     Note: Don t take more than 3,000 mg in one day.   Acetaminophen is found in many medicines (both prescribed and over-the-counter medicines). Read all labels to be sure you don t take too  much.     For children, check the Tylenol bottle for the right dose. The dose is based on the child s age or weight.    3. If you have other health problems (like cancer, heart failure, an organ transplant or severe kidney disease): Call your specialty clinic if you don t feel better in the next 2 days.    4. Know when to call 911: Emergency warning signs include:    Trouble breathing or shortness of breath    Pain or pressure in the chest that doesn t go away    Feeling confused like you haven t felt before, or not being able to wake up    Bluish-colored lips or face    What are the symptoms of COVID-19?     The most common symptoms are cough, fever and trouble breathing.     Less common symptoms include body aches, chills, diarrhea (loose, watery poops), fatigue (feeling very tired), headache, runny nose, sore throat and loss of smell.    COVID-19 can cause severe coughing (bronchitis) and lung infection (pneumonia).    How does it spread?     The virus may spread when a person coughs or sneezes into the air. The virus can travel about 6 feet this way, and it can live on surfaces.      Common  (household disinfectants) will kill the virus.    Who is at risk?  Anyone can catch COVID-19 if they re around someone who has the virus.    How can others protect themselves?     Stay away from people who have COVID-19 (or symptoms of COVID-19).    Wash hands often with soap and water. Or, use hand  with at least 60% alcohol.    Avoid touching the eyes, nose or mouth.     Wear a face mask when you go out in public, when sick or when caring for a sick person.    Where can I get more information?     [facility]: About COVID-19: [email or web address]    CDC: What to Do If You re Sick: www.cdc.gov/coronavirus/2019-ncov/about/steps-when-sick.html    CDC: Ending Home Isolation: www.cdc.gov/coronavirus/2019-ncov/hcp/disposition-in-home-patients.html     CDC: Caring for Someone:  www.cdc.gov/coronavirus/2019-ncov/if-you-are-sick/care-for-someone.html     [facility]: Interim Guidance for Hospital Discharge to Home: www.[facility]/diseases/coronavirus/hcp/hospdischarge.pdf    [facility] clinical trials (COVID-19 research studies): clinicalaffairs.[facility]-clinical-trials     Below are the COVID-19 hotlines at the [place] [facility] ([facility]). Interpreters are available.   o For health questions: Call 509-975-3772 or 1-644.955.1538 (7 a.m. to 7 p.m.)  o For questions about schools and childcare: Call 417-238-7524 or 1-919.281.4172 (7 a.m. to 7 p.m.)          Thank you for taking steps to prevent the spread of this virus.  o Limit your contact with others.  o Wear a simple mask to cover your cough.  o Wash your hands well and often.    Resources    [facility]: About COVID-19: [email or web address]    CDC: What to Do If You're Sick: www.cdc.gov/coronavirus/2019-ncov/about/steps-when-sick.html    CDC: Ending Home Isolation: www.cdc.gov/coronavirus/2019-ncov/hcp/disposition-in-home-patients.html     CDC: Caring for Someone: www.cdc.gov/coronavirus/2019-ncov/if-you-are-sick/care-for-someone.html     [facility]: Interim Guidance for Hospital Discharge to Home: www.[facility]./diseases/coronavirus/hcp/hospdischarge.pdf    [facility] clinical trials (COVID-19 research studies): clinicalaffairs.[facility]-clinical-trials     Below are the COVID-19 hotlines at the [place] [facility] ([facility]). Interpreters are available.   o For health questions: Call 379-649-0324 or 1-911.614.7583 (7 a.m. to 7 p.m.)  o For questions about schools and childcare: Call 148-865-9508 or 1-398.324.2414 (7 a.m. to 7 p.m.)

## 2021-10-27 NOTE — TELEPHONE ENCOUNTER
Additional Information    Information only question and nurse able to answer    Protocols used: INFORMATION ONLY CALL - NO TRIAGE-A-OH

## 2021-12-28 ENCOUNTER — VIRTUAL VISIT (OUTPATIENT)
Dept: INTERNAL MEDICINE | Facility: CLINIC | Age: 41
End: 2021-12-28
Payer: COMMERCIAL

## 2021-12-28 DIAGNOSIS — J45.901 MILD ASTHMA WITH EXACERBATION, UNSPECIFIED WHETHER PERSISTENT: ICD-10-CM

## 2021-12-28 DIAGNOSIS — R05.9 COUGH: Primary | ICD-10-CM

## 2021-12-28 DIAGNOSIS — J45.50 SEVERE PERSISTENT ASTHMA WITHOUT COMPLICATION (H): ICD-10-CM

## 2021-12-28 DIAGNOSIS — S20.211A CONTUSION OF RIB ON RIGHT SIDE, INITIAL ENCOUNTER: ICD-10-CM

## 2021-12-28 PROCEDURE — 99214 OFFICE O/P EST MOD 30 MIN: CPT | Mod: 95 | Performed by: INTERNAL MEDICINE

## 2021-12-28 RX ORDER — PREDNISONE 20 MG/1
40 TABLET ORAL DAILY
Qty: 10 TABLET | Refills: 0 | Status: SHIPPED | OUTPATIENT
Start: 2021-12-28 | End: 2022-01-17

## 2021-12-28 RX ORDER — TRAMADOL HYDROCHLORIDE 50 MG/1
50 TABLET ORAL EVERY 6 HOURS PRN
Qty: 10 TABLET | Refills: 0 | Status: SHIPPED | OUTPATIENT
Start: 2021-12-28 | End: 2021-12-31

## 2021-12-28 RX ORDER — AZITHROMYCIN 250 MG/1
TABLET, FILM COATED ORAL
Qty: 6 TABLET | Refills: 0 | Status: SHIPPED | OUTPATIENT
Start: 2021-12-28 | End: 2022-01-02

## 2021-12-28 NOTE — PROGRESS NOTES
Placido is a 41 year old who is being evaluated via a billable video visit.      How would you like to obtain your AVS? MyChart  If the video visit is dropped, the invitation should be resent by: Text to cell phone: [phone]  Will anyone else be joining your video visit? No    Video Start Time: 11:20 AM    Assessment & Plan     Cough  Persistent dry cough over the last week, better this week slightly. He has asthma and using Symbicort, Duoneb, Singulair. Since had a lot of cough last week, noticed pain in the right upper chest when coughs, sneeze, move his shoulder, elevated the arm. Pain limited the movement of the right arm and he did not go to work in the last 3 days. Note for work provided. He will come to pick it up. Denies fever, chills, exposure to Covid. Immunization UTD.    He will continue his inhalers. I sent Rx for prednisone and Z-chago for asthma exacerbation.   Based on his symptoms and presentation, most probably has rib injury caused with significant cough. He asked foe few days pain meds.  We discussed worsening symptoms, possible PE or pneumonia, if pain present with taking deep breath and if he has dyspnea. He think he is much improved comparing to last week.  Indications for emergency department discussed with patient, who verbalized good understanding and agreement. Patient understands the limitations of today's evaluation.     - predniSONE (DELTASONE) 20 MG tablet; Take 2 tablets (40 mg) by mouth daily  - traMADol (ULTRAM) 50 MG tablet; Take 1 tablet (50 mg) by mouth every 6 hours as needed for severe pain  - azithromycin (ZITHROMAX) 250 MG tablet; Take 2 tablets (500 mg) by mouth daily for 1 day, THEN 1 tablet (250 mg) daily for 4 days.    Severe persistent asthma without complication      Mild asthma with exacerbation, unspecified whether persistent    - predniSONE (DELTASONE) 20 MG tablet; Take 2 tablets (40 mg) by mouth daily  - azithromycin (ZITHROMAX) 250 MG tablet; Take 2 tablets (500  mg) by mouth daily for 1 day, THEN 1 tablet (250 mg) daily for 4 days.    Contusion of rib on right side, initial encounter    - traMADol (ULTRAM) 50 MG tablet; Take 1 tablet (50 mg) by mouth every 6 hours as needed for severe pain    [id number]}       Ranjit Malloy MD  [facility]    Subjective   ROS: 10 point ROS neg other than the symptoms noted above in the HPI.     GENERAL: mild fatigue and wtgain; denies fevers, chills, night sweats.   HEENT: no dysphagia, odonophagia, diplopia, neck pain  THYROID: no apparent hyper or hypothyroid symptoms  CV: no chest pain, pressure, palpitations  LUNGS: noSOB, KEMP, cough, wheezing   ABDOMEN: no diarrhea, constipation, abdominal pain  EXTREMITIES: no rashes, ulcers, edema  NEUROLOGY: no headaches, denies changes in vision, tingling, extremitiy numbness   MSK:no muscle aches or pains, weakness  SKIN: no rashes or lesions  : no menses  PSYCH: stable mood, no significant anxiety or depression  ENDOCRINE: no heat or cold intolerance     Physical Exam(visual exam)  VS: no vital signs taken for video visit  CONSTITUTIONAL: healthy, alert and NAD, well dressed, answering questions appropriately  ENT: no nose swelling or nasal discharge, mouth redness or gumchanges.  EYES: eyes grossly normal to inspection, conjunctivae and sclerae normal, no exophthalmos or proptosis  THYROID: no apparent nodules or goiter  LUNGS: no audible wheeze, cough or visible cyanosis, novisible retractions or increased work of breathing. When elevates the right arm, can provoke the pain in the right upper chest.  ABDOMEN: abdomen not evaluated  EXTREMITIES: no hand tremors, limited exam  NEUROLOGY: CN grossly intact, mentation intact and speech normal   SKIN: noapparent skin lesions, rash, or edema with visualized skin appearance  PSYCH: mentation appears normal, affect normal/bright, judgement and insight intact,   normal speech and appearance well groomed              Video-Visit Details    Type of service:  Video Visit    Video End Time:11:38 AM    Originating Location (pt. Location): Home    Distant Location (provider location):  [facility]     Platform used for Video Visit: Sanaz

## 2022-01-03 ENCOUNTER — VIRTUAL VISIT (OUTPATIENT)
Dept: FAMILY MEDICINE | Facility: CLINIC | Age: 42
End: 2022-01-03
Payer: COMMERCIAL

## 2022-01-03 DIAGNOSIS — J45.50 SEVERE PERSISTENT ASTHMA WITHOUT COMPLICATION (H): ICD-10-CM

## 2022-01-03 DIAGNOSIS — R07.81 RIB PAIN: Primary | ICD-10-CM

## 2022-01-03 PROCEDURE — 99213 OFFICE O/P EST LOW 20 MIN: CPT | Mod: 95 | Performed by: FAMILY MEDICINE

## 2022-01-03 NOTE — PATIENT INSTRUCTIONS
Thanks for checking in Cummins.      The first day away from work was 12/26/2021    It is okay for you to return to work on 1/4/2022 as you are past your isolation..    I would like you to return with restrictions for lifting and pushing and pulling, 15 pounds or less.    Let me know if you have any return or worsening symptoms    It is hard to say whether this was covid-19 or not at this juncture    If all your symptoms have improved, you may return to work without restrictions on 1/14/2022.    I suspect you have fractured a rib with coughing.      John

## 2022-01-03 NOTE — ASSESSMENT & PLAN NOTE
"Sick last week  ? Covid   Sick for 1.5 week    Sx  Fever  Cough  Smell OK  Taste Yes changes   Gone   12/23 Sx started     First day away 12/26 (Sunday)    Have not been back   Virtual Tues [facility]  Explained   Hard to lift arm over shoulder >> right side   Blew nose /     Breathing OK \"no it has improved\"  Worse with cough / Sneeze   VV  Prednisone  Antibiotic   Pills Tramadol     12/26/2021 first day away   RTW 1/4/2022            "

## 2022-01-03 NOTE — PROGRESS NOTES
"Cari checks and possible cracked ribs from coughing  Cold weather and shortness of breath    Needs a return to work slip      Patient Instructions   Thanks for checking in Cummins.      The first day away from work was 12/26/2021    It is okay for you to return to work on 1/4/2022 as you are past your isolation..    I would like you to return with restrictions for lifting and pushing and pulling, 15 pounds or less.    Let me know if you have any return or worsening symptoms    It is hard to say whether this was covid-19 or not at this juncture    If all your symptoms have improved, you may return to work without restrictions on 1/14/2022.    I suspect you have fractured a rib with coughing.      DanL        ASSESSMENT & PLAN    Rib pain with suspected Fracture 12/26/2021   Sick last week  ? Covid   Sick for 1.5 week    Sx  Fever  Cough  Smell OK  Taste Yes changes   Gone   12/23 Sx started     First day away 12/26 (Sunday)    Have not been back   Virtual Tues [facility]  Explained   Hard to lift arm over shoulder >> right side   Blew nose /     Breathing OK \"no it has improved\"  Worse with cough / Sneeze   VV  Prednisone  Antibiotic   Pills Tramadol     12/26/2021 first day away   RTW 1/4/2022                Diagnoses and all orders for this visit:    Rib pain    Severe persistent asthma without complication        There are no Patient Instructions on file for this visit.    Return in about 2 weeks (around 1/17/2022) for if symptoms do not resolve despite plan of care.       PATIENT HEALTH QUESTIONNAIRE-9 (PHQ - 9)    Over the last 2 weeks, how often have you been bothered by any of the following problems?    1. Little interest or pleasure in doing things -      2. Feeling down, depressed, or hopeless -      3. Trouble falling or staying asleep, or sleeping too much -     4. Feeling tired or having little energy -      5. Poor appetite or overeating -      6. Feeling bad about yourself - or that you are a failure or have let " yourself or your family down -      7. Trouble concentrating on things, such as reading the newspaper or watching television -     8. Moving or speaking so slowly that other people could have noticed? Or the opposite - being so fidgety or restless that you have been moving around a lot more than usual     9. Thoughts that you would be better off dead or of hurting  yourself in some way     Total Score:       If you checked off any problems, how difficult have these problems made it for you to do your work, take care of things at home, or get along with other people?      Developed by Shirley Trammell, Hailey Davila, Silver Vargas and colleagues, with an educational timo from [employer] No permission required to reproduce, translate, display or distribute. permission required to reproduce, translate, display or distribute.    CHIEF COMPLAINT: Placido Broderick had no chief complaint listed for this encounter.    [place]: 1.............. SUBJECTIVE:  Placido Broderick is a 41 year old male had no chief complaint listed for this encounter.    1. Rib pain    2. Severe persistent asthma without complication          Allergies   Allergen Reactions     Hydrocodone-Acetaminophen Shortness Of Breath     Occurred many years ago                         SOCIAL: He  reports that he quit smoking about 3 years ago. He smoked 0.00 packs per day. He has never used smokeless tobacco. He reports current alcohol use of about 14.0 standard drinks of alcohol per week. He reports that he does not use drugs.    REVIEW OF SYSTEMS:   Family history not pertinent to chief complaint or presenting problem    Review of systems otherwise negative as requested from patient, except   Those positive ROS outlined and discussed in [place].      VITALS:  There were no vitals filed for this visit.  Wt Readings from Last 3 Encounters:   02/01/21 127.5 kg (281 lb)   11/20/20 127.8 kg (281 lb 12.8 oz)   09/21/20 125.2 kg (276 lb)     There is no height or weight on  file to calculate BMI.           I spent 28 minutes on telephone with this patient.       to Diagnoses and all orders for this visit:    Rib pain    Severe persistent asthma without complication        Harry Bess MD  [place] [phone]

## 2022-01-11 ENCOUNTER — TELEPHONE (OUTPATIENT)
Dept: PULMONOLOGY | Facility: OTHER | Age: 42
End: 2022-01-11
Payer: COMMERCIAL

## 2022-01-11 NOTE — TELEPHONE ENCOUNTER
Spoke with Placido.  He is not able to see Dr Davila at 4:00 today as he is working.     Per , if this is urgent, he would have to follow up with another provider.       Placido states that he will follow up with his PCP and if an immediate appointment is needed, he will call back.  Sent to scheduling to set up appt with  for February.

## 2022-01-11 NOTE — TELEPHONE ENCOUNTER
Phone call from patient's wife asking to set up virtual visit or phone visit with .  She called for appt and nothing available until February.    States that she does not feel the medications Placido has been taking are working. His wheezing and sob has been worsening with the cold weather.  PH:  [phone]

## 2022-01-17 ENCOUNTER — VIRTUAL VISIT (OUTPATIENT)
Dept: FAMILY MEDICINE | Facility: CLINIC | Age: 42
End: 2022-01-17
Payer: COMMERCIAL

## 2022-01-17 DIAGNOSIS — R05.9 COUGH: ICD-10-CM

## 2022-01-17 DIAGNOSIS — J45.50 SEVERE PERSISTENT ASTHMA WITHOUT COMPLICATION (H): Primary | ICD-10-CM

## 2022-01-17 DIAGNOSIS — J45.901 MILD ASTHMA WITH EXACERBATION, UNSPECIFIED WHETHER PERSISTENT: ICD-10-CM

## 2022-01-17 PROCEDURE — 99213 OFFICE O/P EST LOW 20 MIN: CPT | Mod: 95 | Performed by: FAMILY MEDICINE

## 2022-01-17 RX ORDER — PREDNISONE 20 MG/1
20 TABLET ORAL 2 TIMES DAILY
Qty: 20 TABLET | Refills: 3 | Status: SHIPPED | OUTPATIENT
Start: 2022-01-17 | End: 2022-07-12

## 2022-01-17 ASSESSMENT — ASTHMA QUESTIONNAIRES
QUESTION_1 LAST FOUR WEEKS HOW MUCH OF THE TIME DID YOUR ASTHMA KEEP YOU FROM GETTING AS MUCH DONE AT WORK, SCHOOL OR AT HOME: SOME OF THE TIME
QUESTION_3 LAST FOUR WEEKS HOW OFTEN DID YOUR ASTHMA SYMPTOMS (WHEEZING, COUGHING, SHORTNESS OF BREATH, CHEST TIGHTNESS OR PAIN) WAKE YOU UP AT NIGHT OR EARLIER THAN USUAL IN THE MORNING: FOUR OR MORE NIGHTS A WEEK
QUESTION_4 LAST FOUR WEEKS HOW OFTEN HAVE YOU USED YOUR RESCUE INHALER OR NEBULIZER MEDICATION (SUCH AS ALBUTEROL): THREE OR MORE TIMES PER DAY
QUESTION_5 LAST FOUR WEEKS HOW WOULD YOU RATE YOUR ASTHMA CONTROL: SOMEWHAT CONTROLLED
QUESTION_2 LAST FOUR WEEKS HOW OFTEN HAVE YOU HAD SHORTNESS OF BREATH: THREE TO SIX TIMES A WEEK
ACT_TOTALSCORE: 11

## 2022-01-17 NOTE — PATIENT INSTRUCTIONS
[facility] from [facility]    Use the prednisone  20 mg twice daily for 3 days as you are    You can use Symbicort 2 puffs up to 5 times a day for exacerbations of asthma    See allergy to exclude an underlying problem and talk about next level of treatment such as medications like to Dupixent

## 2022-01-17 NOTE — PROGRESS NOTES
"Placido is a 41 year old who is being evaluated via a billable video visit.      How would you like to obtain your AVS? MyChart  If the video visit is dropped, the invitation should be resent by: Text to cell phone: [phone]  Will anyone else be joining your video visit? No      Video Start Time: 9:34     Problem List Items Addressed This Visit     Severe persistent asthma without complication - Primary    Relevant Orders    Nebulizer and Supplies Order for DME - ONLY FOR DME    Adult Allergy/Asthma Referral      Other Visit Diagnoses     Cough        Relevant Medications    predniSONE (DELTASONE) 20 MG tablet    Mild asthma with exacerbation, unspecified whether persistent        Relevant Medications    predniSONE (DELTASONE) 20 MG tablet        Patient Instructions   [facility] from [facility]    Use the prednisone  20 mg twice daily for 3 days as you are    You can use Symbicort 2 puffs up to 5 times a day for exacerbations of asthma    See allergy to exclude an underlying problem and talk about next level of treatment such as medications like to Dupixent         Subjective   Placido is a 41 year old who presents for the following health issues     HPI   Asthma    Worse in winter  \"not sure what triggers\"  Past week bad  Time to time months      Symbicort 160-4.5   2 puff BID   Montelukast 10 mg Bedtime     Prednisone for   2 daily for 3 days       As needed     Feel like it does not work  Nebulizer is more effective   At home     Review of Systems   Trigger ?  Allergies ??           Objective           Vitals:  No vitals were obtained today due to virtual visit.    Physical Exam               Video-Visit Details    Type of service:  Video Visit    Video End Time:9:49     Originating Location (pt. Location) home     Distant Location (provider location):  [facility]     Platform used for Video Visit: arely"

## 2022-01-18 ASSESSMENT — ASTHMA QUESTIONNAIRES: ACT_TOTALSCORE: 11

## 2022-06-26 ENCOUNTER — HEALTH MAINTENANCE LETTER (OUTPATIENT)
Age: 42
End: 2022-06-26

## 2022-07-12 ENCOUNTER — OFFICE VISIT (OUTPATIENT)
Dept: FAMILY MEDICINE | Facility: CLINIC | Age: 42
End: 2022-07-12
Payer: COMMERCIAL

## 2022-07-12 VITALS
WEIGHT: 285 LBS | OXYGEN SATURATION: 96 % | SYSTOLIC BLOOD PRESSURE: 136 MMHG | BODY MASS INDEX: 48.65 KG/M2 | HEIGHT: 64 IN | DIASTOLIC BLOOD PRESSURE: 86 MMHG | HEART RATE: 98 BPM

## 2022-07-12 DIAGNOSIS — J45.40 MODERATE PERSISTENT ASTHMA WITHOUT COMPLICATION: ICD-10-CM

## 2022-07-12 DIAGNOSIS — E66.01 MORBID OBESITY (H): ICD-10-CM

## 2022-07-12 DIAGNOSIS — R05.9 COUGH: ICD-10-CM

## 2022-07-12 DIAGNOSIS — J45.901 MILD ASTHMA WITH EXACERBATION, UNSPECIFIED WHETHER PERSISTENT: ICD-10-CM

## 2022-07-12 DIAGNOSIS — Z11.4 SCREENING FOR HIV (HUMAN IMMUNODEFICIENCY VIRUS): ICD-10-CM

## 2022-07-12 DIAGNOSIS — Z11.59 NEED FOR HEPATITIS C SCREENING TEST: ICD-10-CM

## 2022-07-12 DIAGNOSIS — I10 BENIGN ESSENTIAL HTN: ICD-10-CM

## 2022-07-12 DIAGNOSIS — D84.9 IMMUNOSUPPRESSION (H): Primary | ICD-10-CM

## 2022-07-12 PROCEDURE — 99213 OFFICE O/P EST LOW 20 MIN: CPT | Performed by: FAMILY MEDICINE

## 2022-07-12 RX ORDER — MONTELUKAST SODIUM 10 MG/1
10 TABLET ORAL AT BEDTIME
Qty: 30 TABLET | Refills: 11 | Status: SHIPPED | OUTPATIENT
Start: 2022-07-12 | End: 2022-08-05

## 2022-07-12 RX ORDER — ALBUTEROL SULFATE 90 UG/1
2 AEROSOL, METERED RESPIRATORY (INHALATION) EVERY 6 HOURS
Qty: 18 G | Refills: 3 | Status: SHIPPED | OUTPATIENT
Start: 2022-07-12

## 2022-07-12 RX ORDER — BUDESONIDE AND FORMOTEROL FUMARATE DIHYDRATE 160; 4.5 UG/1; UG/1
2 AEROSOL RESPIRATORY (INHALATION) 2 TIMES DAILY
Qty: 10.2 G | Refills: 11 | Status: SHIPPED | OUTPATIENT
Start: 2022-07-12

## 2022-07-12 RX ORDER — PREDNISONE 20 MG/1
20 TABLET ORAL 2 TIMES DAILY
Qty: 20 TABLET | Refills: 3 | Status: SHIPPED | OUTPATIENT
Start: 2022-07-12

## 2022-07-12 RX ORDER — AMLODIPINE BESYLATE 5 MG/1
5 TABLET ORAL DAILY
Qty: 90 TABLET | Refills: 3 | Status: SHIPPED | OUTPATIENT
Start: 2022-07-12

## 2022-07-12 RX ORDER — IPRATROPIUM BROMIDE AND ALBUTEROL SULFATE 2.5; .5 MG/3ML; MG/3ML
1 SOLUTION RESPIRATORY (INHALATION) EVERY 6 HOURS PRN
Qty: 90 ML | Refills: 3 | Status: SHIPPED | OUTPATIENT
Start: 2022-07-12

## 2022-07-13 DIAGNOSIS — J45.901 MILD ASTHMA WITH EXACERBATION, UNSPECIFIED WHETHER PERSISTENT: ICD-10-CM

## 2022-07-13 PROBLEM — D84.9 IMMUNOSUPPRESSION (H): Status: ACTIVE | Noted: 2022-07-13

## 2022-07-13 NOTE — PROGRESS NOTES
Answers for HPI/ROS submitted by the patient on 7/12/2022  Do you have a cough?: No  Are you experiencing any wheezing in your chest?: Yes  Do you have any shortness of breath?: Yes  Use of rescue inhaler:: a few times a month  Taking Asthma medication as prescribed:: 0  Asthma triggers:: cold air, humidity, pollens, strong odors and fumes  Have you had any Emergency Room visits, Urgent Care visits, or Hospital Admissions since your last office visit?: No  How many days per week do you miss taking your medication?: 0    Conflicting answers have been found for some questions. Please document the patient's answers manually.    Chart relates his been using his inhalers  Biggest triggers perfumes and smells  Has seen pulmonary    History of severe persistent asthma to moderate persistent asthma    Doing better with medications  By himself about his weight    Here today with his wife    Examination delightful gentleman no distress    Lungs slight wheeze  Cardiac regular rate and rhythm no appreciable murmur neck  BMI 48      Assessment plan  Persistent asthma  Discussed the use of Symbicort as a rescue as well as controller medication    Absolutely encourage discontinuation of tobacco as this can be a trigger    Try to identify triggers and short-circuit these    Follow through on blood pressure  Currently blood pressure at goal    Refill today of his blood pressure medication    John

## 2022-07-13 NOTE — PATIENT INSTRUCTIONS
Continue use of Symbicort  2 puffs twice daily    Use it also as a rescue inhaler    Continue blood pressure medication at goal    Pursue active rebalancing of weight    Absolutely no tobacco  Try to identify triggers for asthma    Always great to see you!    John

## 2022-07-14 RX ORDER — BUDESONIDE AND FORMOTEROL FUMARATE DIHYDRATE 160; 4.5 UG/1; UG/1
2 AEROSOL RESPIRATORY (INHALATION) 2 TIMES DAILY
Qty: 30.6 G | Refills: 11 | OUTPATIENT
Start: 2022-07-14

## 2022-07-14 RX ORDER — ALBUTEROL SULFATE 90 UG/1
2 AEROSOL, METERED RESPIRATORY (INHALATION) EVERY 6 HOURS
Qty: 54 G | Refills: 3 | OUTPATIENT
Start: 2022-07-14

## 2022-07-29 ENCOUNTER — OFFICE VISIT (OUTPATIENT)
Dept: PULMONOLOGY | Facility: OTHER | Age: 42
End: 2022-07-29
Payer: COMMERCIAL

## 2022-07-29 VITALS
BODY MASS INDEX: 49.01 KG/M2 | OXYGEN SATURATION: 96 % | HEART RATE: 109 BPM | DIASTOLIC BLOOD PRESSURE: 78 MMHG | WEIGHT: 285.5 LBS | SYSTOLIC BLOOD PRESSURE: 130 MMHG

## 2022-07-29 DIAGNOSIS — J45.40 MODERATE PERSISTENT EXTRINSIC ASTHMA WITHOUT COMPLICATION: Primary | ICD-10-CM

## 2022-07-29 DIAGNOSIS — G47.10 HYPERSOMNIA: ICD-10-CM

## 2022-07-29 PROCEDURE — 99214 OFFICE O/P EST MOD 30 MIN: CPT | Performed by: INTERNAL MEDICINE

## 2022-07-29 ASSESSMENT — ASTHMA QUESTIONNAIRES
ACT_TOTALSCORE: 17
QUESTION_3 LAST FOUR WEEKS HOW OFTEN DID YOUR ASTHMA SYMPTOMS (WHEEZING, COUGHING, SHORTNESS OF BREATH, CHEST TIGHTNESS OR PAIN) WAKE YOU UP AT NIGHT OR EARLIER THAN USUAL IN THE MORNING: ONCE OR TWICE
QUESTION_2 LAST FOUR WEEKS HOW OFTEN HAVE YOU HAD SHORTNESS OF BREATH: ONCE OR TWICE A WEEK
QUESTION_1 LAST FOUR WEEKS HOW MUCH OF THE TIME DID YOUR ASTHMA KEEP YOU FROM GETTING AS MUCH DONE AT WORK, SCHOOL OR AT HOME: SOME OF THE TIME
QUESTION_4 LAST FOUR WEEKS HOW OFTEN HAVE YOU USED YOUR RESCUE INHALER OR NEBULIZER MEDICATION (SUCH AS ALBUTEROL): TWO OR THREE TIMES PER WEEK
QUESTION_5 LAST FOUR WEEKS HOW WOULD YOU RATE YOUR ASTHMA CONTROL: SOMEWHAT CONTROLLED
ACT_TOTALSCORE: 17

## 2022-07-29 NOTE — PATIENT INSTRUCTIONS
1) I think you should check in with an allergist to think about what you may be allergic too  2) I also agree with your wife about seeing a sleep doc.  You have a number of risk factors for sleep apnea  3) I will see you back in another 3 months to check in with your asthma until you are reconnected with Dr. Bess for sure

## 2022-07-29 NOTE — PROGRESS NOTES
"Assessment and Plan:Placido Broderick is a 42 year old male with a past medical history significant for severe persistent asthma who presents to clinic today for follow up. His asthma control is marginal at best.  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  He also has significant symptoms of sleep apnea, that could pre-dispose him to asthma flares.    1) Asthma - continue symbicort and singulair.  As needed albuterol.  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  He knows some of his triggers, but has asked for work accomadations to avoid getting ill and if we can identify some clear triggers, we may be able to add more evidence to his requests.  2) Snoring, hypersomnia - his wife is really worried about his snoring and witnessed apneas.  Stop Bang is 7/8 (not over 50).  3) RTC in 3 months          CCx: asthma    HPI: Mr. Broderick is a 42 year old male with a history of severe persistent asthma who presents for follow up. Since his last visit he has required prednisone a couple of times and developed COVID infection a couple of months ago that he says was mild.  He managed to quit smoking 2 months ago.  He does not use his albuterol neb or MDI that often, but he did get a course of prednisone last week.  He feels this last month has been a little harder, and he thinks he has allergies to something.  He isn't sure what.      ROS:  Review of Systems - History obtained from the patient  General ROS: negative  Psychological ROS: negative  ENT ROS: negative  Allergy and Immunology ROS: negative  Endocrine ROS: negative  Respiratory ROS: positive for - cough, shortness of breath and " wheezing  negative for - sputum changes, stridor or tachypnea  Cardiovascular ROS: no chest pain or palpitations  Gastrointestinal ROS: no abdominal pain, change in bowel habits, or black or bloody stools  Genito-Urinary ROS: no dysuria, trouble voiding, or hematuria  Musculoskeletal ROS: negative  Neurological ROS: no TIA or stroke symptoms  Dermatological ROS: negative      Current Meds:  Current Outpatient Medications   Medication Sig Dispense Refill     albuterol (PROAIR HFA/PROVENTIL HFA/VENTOLIN HFA) 108 (90 Base) MCG/ACT inhaler Inhale 2 puffs into the lungs every 6 hours 18 g 3     amLODIPine (NORVASC) 5 MG tablet Take 1 tablet (5 mg) by mouth daily 90 tablet 3     budesonide-formoterol (SYMBICORT) 160-4.5 MCG/ACT Inhaler Inhale 2 puffs into the lungs 2 times daily 10.2 g 11     ipratropium - albuterol 0.5 mg/2.5 mg/3 mL (DUONEB) 0.5-2.5 (3) MG/3ML neb solution Take 1 vial (3 mLs) by nebulization every 6 hours as needed for shortness of breath / dyspnea or wheezing 90 mL 3     montelukast (SINGULAIR) 10 MG tablet Take 1 tablet (10 mg) by mouth At Bedtime 30 tablet 11     predniSONE (DELTASONE) 20 MG tablet Take 1 tablet (20 mg) by mouth 2 times daily For 3-10 days 20 tablet 3       Labs:  @clab@  Lab Results   Component Value Date    WBC 7.5 04/23/2019    HGB 16.3 04/23/2019    HCT 48.7 04/23/2019     04/23/2019     02/28/2019    POTASSIUM 3.6 02/28/2019    CHLORIDE 101 02/28/2019    CO2 31 02/28/2019     02/28/2019    BUN 17 02/28/2019    CR 0.83 02/28/2019    BILITOTAL 0.7 05/18/2018    AST 31 05/18/2018    ALT 43 05/18/2018    ALKPHOS 62 05/18/2018    PROTTOTAL 7.5 05/18/2018    ALBUMIN 3.6 05/18/2018       I have personally reviewed all pertinent imaging studies and PFT results unless otherwise noted.    Imaging studies:  XR Chest 2 Views    Result Date: 2/27/2019  XR CHEST 2 VIEWS 2/27/2019 10:48 AM INDICATION: Sob COMPARISON: 01/11/2019 FINDINGS: Minimal change. There is very slight  central interstitial prominence suggesting minimal bronchial wall thickening. No focal pneumonia or pleural effusion. Heart size normal.    Echocardiogram Complete    Result Date: 2/27/2019  1. Normal left ventricular size and systolic performance with a visually estimated ejection fraction of 50-55%. 2. No significant valvular heart disease is identified on this study. 3. Normal right ventricular size and systolic performance.     CT Chest w/o Contrast    Result Date: 2/27/2019  [facility] RADIOLOGY EXAM: CT CHEST WO CONTRAST LOCATION: [facility] DATE/TIME: 2/27/2019 6:01 PM INDICATION: Shortness of breath persistent wheezing, reactive airway disease, wondering about possiblity of parenchymal lung disease COMPARISON: Chest x-ray the same date TECHNIQUE: Helical images were obtained through the chest. Multiplanar reformats were obtained. Dose reduction techniques were used. IV CONTRAST: None. FINDINGS: LUNGS AND PLEURA: The central airways are clear. No focal consolidation or pleural effusion. No pulmonary mass or suspicious nodule. MEDIASTINUM: Prominent bilateral axillary as well as mediastinal lymph nodes which are likely reactive. A dominant anterior paratracheal node measures 8 mm short axis dimension. Normal heart size. Trace pericardial fluid. LIMITED UPPER ABDOMEN: Negative. MUSCULOSKELETAL: Multiple old healed right-sided rib fractures. There are also healing displaced fractures of the left 10th and 11th ribs.     CONCLUSION: 1.  No acute pulmonary parenchymal findings. No focal pneumonic infiltrate or pleural effusion. 2.  Healing displaced fractures of the left 10th and 11th ribs. Old healed right-sided rib fractures are also noted.         Physical Exam:  /78 (BP Location: Right arm, Patient Position: Sitting, Cuff Size: Adult Large)   Pulse 109   Wt 129.5 kg (285 lb 8 oz)   SpO2 96%   BMI 49.01 kg/m    General - Well nourished  Ears/Mouth -  Deferred given mask use during pandemic  Neck  - no cervical lymphadenopathy  Lungs - Clear to ausculation bilaterally   CVS - regular rhythm with no murmurs, rubs or gallups  Abdomen - soft, NT, ND, NABS  Ext - no cyanosis, clubbing or edema  Skin - no rash  Psychology - alert and oriented, answers appropriate        Electronically signed by:    Aaron Davila MD PhD  [facility]

## 2022-08-05 DIAGNOSIS — J45.901 MILD ASTHMA WITH EXACERBATION, UNSPECIFIED WHETHER PERSISTENT: ICD-10-CM

## 2022-08-05 RX ORDER — MONTELUKAST SODIUM 10 MG/1
10 TABLET ORAL AT BEDTIME
Qty: 30 TABLET | Refills: 11 | Status: SHIPPED | OUTPATIENT
Start: 2022-08-05

## 2022-11-21 ENCOUNTER — HEALTH MAINTENANCE LETTER (OUTPATIENT)
Age: 42
End: 2022-11-21

## 2023-07-09 ENCOUNTER — HEALTH MAINTENANCE LETTER (OUTPATIENT)
Age: 43
End: 2023-07-09

## 2024-02-02 ENCOUNTER — LAB REQUISITION (OUTPATIENT)
Dept: LAB | Facility: CLINIC | Age: 44
End: 2024-02-02

## 2024-02-02 DIAGNOSIS — Z13.220 ENCOUNTER FOR SCREENING FOR LIPOID DISORDERS: ICD-10-CM

## 2024-02-02 DIAGNOSIS — I10 ESSENTIAL (PRIMARY) HYPERTENSION: ICD-10-CM

## 2024-02-02 LAB
ALBUMIN SERPL BCG-MCNC: 4.1 G/DL (ref 3.5–5.2)
ALP SERPL-CCNC: 65 U/L (ref 40–150)
ALT SERPL W P-5'-P-CCNC: 43 U/L (ref 0–70)
ANION GAP SERPL CALCULATED.3IONS-SCNC: 11 MMOL/L (ref 7–15)
AST SERPL W P-5'-P-CCNC: 22 U/L (ref 0–45)
BILIRUB SERPL-MCNC: 0.7 MG/DL
BUN SERPL-MCNC: 15.2 MG/DL (ref 6–20)
CALCIUM SERPL-MCNC: 9 MG/DL (ref 8.6–10)
CHLORIDE SERPL-SCNC: 104 MMOL/L (ref 98–107)
CHOLEST SERPL-MCNC: 206 MG/DL
CREAT SERPL-MCNC: 0.8 MG/DL (ref 0.67–1.17)
DEPRECATED HCO3 PLAS-SCNC: 24 MMOL/L (ref 22–29)
EGFRCR SERPLBLD CKD-EPI 2021: >90 ML/MIN/1.73M2
FASTING STATUS PATIENT QL REPORTED: ABNORMAL
GLUCOSE SERPL-MCNC: 120 MG/DL (ref 70–99)
HDLC SERPL-MCNC: 54 MG/DL
LDLC SERPL CALC-MCNC: 133 MG/DL
NONHDLC SERPL-MCNC: 152 MG/DL
POTASSIUM SERPL-SCNC: 4.2 MMOL/L (ref 3.4–5.3)
PROT SERPL-MCNC: 7.4 G/DL (ref 6.4–8.3)
SODIUM SERPL-SCNC: 139 MMOL/L (ref 135–145)
TRIGL SERPL-MCNC: 93 MG/DL
TSH SERPL DL<=0.005 MIU/L-ACNC: 3.31 UIU/ML (ref 0.3–4.2)

## 2024-02-02 PROCEDURE — 84443 ASSAY THYROID STIM HORMONE: CPT | Performed by: FAMILY MEDICINE

## 2024-02-02 PROCEDURE — 80061 LIPID PANEL: CPT | Performed by: FAMILY MEDICINE

## 2024-02-02 PROCEDURE — 80053 COMPREHEN METABOLIC PANEL: CPT | Performed by: FAMILY MEDICINE

## 2024-08-31 ENCOUNTER — HEALTH MAINTENANCE LETTER (OUTPATIENT)
Age: 44
End: 2024-08-31

## 2024-10-08 ENCOUNTER — LAB REQUISITION (OUTPATIENT)
Dept: LAB | Facility: CLINIC | Age: 44
End: 2024-10-08

## 2024-10-08 DIAGNOSIS — E11.9 TYPE 2 DIABETES MELLITUS WITHOUT COMPLICATIONS (H): ICD-10-CM

## 2024-10-08 LAB
ANION GAP SERPL CALCULATED.3IONS-SCNC: 9 MMOL/L (ref 7–15)
BUN SERPL-MCNC: 13.8 MG/DL (ref 6–20)
CALCIUM SERPL-MCNC: 9.1 MG/DL (ref 8.8–10.4)
CHLORIDE SERPL-SCNC: 100 MMOL/L (ref 98–107)
CREAT SERPL-MCNC: 0.68 MG/DL (ref 0.67–1.17)
CREAT UR-MCNC: 27.2 MG/DL
EGFRCR SERPLBLD CKD-EPI 2021: >90 ML/MIN/1.73M2
GLUCOSE SERPL-MCNC: 91 MG/DL (ref 70–99)
HCO3 SERPL-SCNC: 28 MMOL/L (ref 22–29)
MICROALBUMIN UR-MCNC: <12 MG/L
MICROALBUMIN/CREAT UR: NORMAL MG/G{CREAT}
POTASSIUM SERPL-SCNC: 3.8 MMOL/L (ref 3.4–5.3)
SODIUM SERPL-SCNC: 137 MMOL/L (ref 135–145)

## 2024-10-08 PROCEDURE — 82043 UR ALBUMIN QUANTITATIVE: CPT | Performed by: FAMILY MEDICINE

## 2024-10-08 PROCEDURE — 84132 ASSAY OF SERUM POTASSIUM: CPT | Performed by: FAMILY MEDICINE

## 2024-10-08 PROCEDURE — 80048 BASIC METABOLIC PNL TOTAL CA: CPT | Performed by: FAMILY MEDICINE

## 2024-11-09 ENCOUNTER — HEALTH MAINTENANCE LETTER (OUTPATIENT)
Age: 44
End: 2024-11-09

## 2025-03-02 ENCOUNTER — HEALTH MAINTENANCE LETTER (OUTPATIENT)
Age: 45
End: 2025-03-02

## 2025-06-21 ENCOUNTER — HEALTH MAINTENANCE LETTER (OUTPATIENT)
Age: 45
End: 2025-06-21